# Patient Record
Sex: FEMALE | Race: BLACK OR AFRICAN AMERICAN | Employment: OTHER | URBAN - METROPOLITAN AREA
[De-identification: names, ages, dates, MRNs, and addresses within clinical notes are randomized per-mention and may not be internally consistent; named-entity substitution may affect disease eponyms.]

---

## 2017-03-07 ENCOUNTER — HOSPITAL ENCOUNTER (OUTPATIENT)
Dept: MAMMOGRAPHY | Age: 57
Discharge: HOME | End: 2017-03-07
Admitting: OTOLARYNGOLOGY
Payer: COMMERCIAL

## 2017-03-07 DIAGNOSIS — Z12.31 VISIT FOR SCREENING MAMMOGRAM: ICD-10-CM

## 2017-03-07 PROCEDURE — 77067 SCR MAMMO BI INCL CAD: CPT

## 2017-05-10 ENCOUNTER — HOSPITAL ENCOUNTER (OUTPATIENT)
Dept: PHYSICAL THERAPY | Age: 57
Discharge: HOME | End: 2017-05-10
Payer: COMMERCIAL

## 2017-05-10 PROCEDURE — 97140 MANUAL THERAPY 1/> REGIONS: CPT | Performed by: PHYSICAL THERAPIST

## 2017-05-10 PROCEDURE — 97161 PT EVAL LOW COMPLEX 20 MIN: CPT | Performed by: PHYSICAL THERAPIST

## 2017-05-10 PROCEDURE — 97110 THERAPEUTIC EXERCISES: CPT | Performed by: PHYSICAL THERAPIST
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
## 2017-05-17 ENCOUNTER — HOSPITAL ENCOUNTER (OUTPATIENT)
Dept: PHYSICAL THERAPY | Age: 57
Discharge: HOME | End: 2017-05-17
Payer: COMMERCIAL

## 2017-05-17 PROCEDURE — 97140 MANUAL THERAPY 1/> REGIONS: CPT | Performed by: PHYSICAL THERAPIST

## 2017-05-17 PROCEDURE — 97110 THERAPEUTIC EXERCISES: CPT | Performed by: PHYSICAL THERAPIST
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
## 2017-05-19 ENCOUNTER — HOSPITAL ENCOUNTER (OUTPATIENT)
Dept: PHYSICAL THERAPY | Age: 57
Discharge: HOME | End: 2017-05-19
Payer: COMMERCIAL

## 2017-05-19 PROCEDURE — 97140 MANUAL THERAPY 1/> REGIONS: CPT | Performed by: PHYSICAL THERAPIST

## 2017-05-19 PROCEDURE — 97110 THERAPEUTIC EXERCISES: CPT | Performed by: PHYSICAL THERAPIST
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
## 2017-05-23 ENCOUNTER — HOSPITAL ENCOUNTER (OUTPATIENT)
Dept: PHYSICAL THERAPY | Age: 57
Discharge: HOME | End: 2017-05-23
Payer: COMMERCIAL

## 2017-05-23 NOTE — PROGRESS NOTES
[facility]  [address], [address] [facility]    OUTPATIENT physical Therapy      5/23/2017:  Mayur Apple was not seen on this date for physical therapy for the following reasons:    [x]     Patient called to cancel the visit for the following reasons: transportation   []     Patient missed the visit and did not call to cancel.     Mekhi Eng, PT

## 2017-05-26 ENCOUNTER — HOSPITAL ENCOUNTER (OUTPATIENT)
Dept: PHYSICAL THERAPY | Age: 57
Discharge: HOME | End: 2017-05-26
Payer: COMMERCIAL

## 2017-05-26 PROCEDURE — 97110 THERAPEUTIC EXERCISES: CPT | Performed by: PHYSICAL THERAPIST

## 2017-05-26 PROCEDURE — 97014 ELECTRIC STIMULATION THERAPY: CPT | Performed by: PHYSICAL THERAPIST

## 2017-05-26 PROCEDURE — 97140 MANUAL THERAPY 1/> REGIONS: CPT | Performed by: PHYSICAL THERAPIST

## 2017-05-26 NOTE — PROGRESS NOTES
[facility]  [address], [address] [facility]    OUTPATIENT physical Therapy DAILY Treatment NOTe  Visit: 4    REASSESSMENT FOR BILATERAL KNEE PAIN    NAME: Wilner Burks AGE: 56 y.o. GENDER: female  DATE: 5/26/2017  REFERRING PHYSICIAN: Kelsey Todd MD ; Ekaterina Dexter MD      GOALS  Short term goals  Time frame: 3 weeks  1. Patient will be compliant and independent with the initial HEP as evidenced by being able to perform without cueing. MET  2. Patient will report a 25% improvement in symptoms. 3. Patient report a 25% improvement in sleeping. 4. Patient will have an increased tolerance for driving to allow 20 minutes of the activity before symptoms start. 5. Patient will tolerate 20 minutes of clinic activities before onset of symptoms.      Long term goals  Time frame: 6 weeks  1. Patient will report pain level decrease to 2/10 to allow increased ease of movement. 2. Patient will have an improved score on the NDI outcome measure by 7 points to demonstrate an increase in functional activity tolerance. 3. Patient will be independent in final individualized HEP. 4. Patient will have full, pain free AROM of cervical spine to allow ease with driving. 5. Patient will return to work without being limited by symptoms. 6. Patient will sleep 6 hours without being interrupted by pain  7. Patient will improve bilateral knee flexion AAROM by 5 degrees to improve gait. New referral for bilateral knee OA: 5/26/17  Bilateral primary osteoarthritis of knee (M17.0)    SUBJECTIVE:   \"Everything is sore today.  I got shots in my knees yesterday\"    Pain In: 6/10 in low back and neck; 10/10 in bilateral knees    OBJECTIVE DATA SUMMARY:     EXAMINATION/PRESENTATION/DECISION MAKING:   Pain:   Location:left sided neck pain; left sided low back pain (neck >back)  Quality: aching  Now: 6/10  Best: 5/10  Worst: 6/10  Factors that improve pain: heat, medication: Celebrex used and beneficial     Pain: added 5/26/17- new referral   Location: bilateral knees: Lt >Rt  Now: 10/10  Best: 7/10  Worst:10/10     Posture:    Forward head, rounded shoulders     Spinal Assessment:   Cervical Spine (AROM)  Flexion:  25% limited; stretch  Extension: 25% limited; no pain  R side bend: 25% limited; stretch  L side bend: 25% limited; no pain  R rotation: 25% limited; stretch  L rotation: 25% limited; no pain     Lumbar Spine (AROM)  (*Measured 3rd finger from the floor)  Flexion  12\"; no increase in pain  Extension 50% limited; pain  R side bend 19\"  L side bend 19\"; stretch  R rotation WNL; stretch  L rotation WNL     Joint Mobility:   Mild hypomobility noted in cervical spine  Hypomobile in patella glides B, sup/inf, med/lat     Range of Motion: added 5/26/17-new referral   Left knee:  Flexion: AROM: 96 degrees; AAROM: 102 degrees  Extension: 0 degrees    Right knee:   Flexion: AROM: 96 degrees; AAROM: 105 degrees  Extension: 0 degrees     Palpation:   Tender to palpation at left UT, scalenes, levator scapulae, rhomboids, and sub occipitals  Mild tenderness to erector spinae musculature in lower thoracic spine  Mild tenderness to palpation at bilateral knee joint and patella      Neurologic Assessment:  Tone: Normal  Sensation: Intact  Reflexes: NT     Special Tests:   (+) Spurlings  (-) Slump  (-) Lyssa's     Mobility:  Transitional Movements: Increased time to perform   Gait: Patient ambulates with straight cane due to chronic bilateral knee pain     Functional Measure:   NDI: 24/50     TREATMENT/INTERVENTION:  Modalities (Rationale): to decrease pain and muscle guarding  MHP and e stim to neck and low back in supine for for 15 minutes at end of session; no skin irritation noted  Cold pack to bilateral knees in supine with bolster under knees     Therapeutic Exercises:  HEP provided on evaluation:  LTR, SKTC, Sidelying lumbar rotation stretch, scalene stretch,   levator scap stretch, UT stretch, chin tucks, scapular retraction    Added to HEP 5/26/17: quad sets, SLR, heel slides, hip adduction with ball, hip abduction with TB;  Standing hip flexion, hip abduction, hamstring curls, and mini squats     Chin tucks: 10 reps with 5 second holds  Scapular retraction with green TB: 2 sets of 10 reps  Scalene stretch: 3 reps with 15 second holds  Levator scapulae stretch: 3 reps with 15 second holds  UT stretch (to right): 3 reps with 15 second holds  Lower cervical/upper thoracic stretch: 3 reps with 15 second holds  Biceps/chest stretch: 3 reps with 15 second holds  Corner stretch (pecs): 3 reps with 15 second holds      LTR: 10 reps  BKTC with yellow physioball: 10 reps   Sidelying lumbar rotation stretch (left): 2 reps with 15 second holds  Thoracolumbar stretch with arm overhead: 5 reps with 15 second holds  Seated lumbar rotation stretch: 3 reps with 10 second holds  Forward flexion on blue physioball: 10 reps     Quad sets with ball under knee: 10 reps with 5 second holds  Heel slides: 10 reps  SLR: 10 reps  Hip adduction with ball: 10 reps with 5 second holds   Hip abduction with green TB: 10 reps     Standing hip flexion: 10 reps  Standing hip abduction: 10 reps  Standing hamstring curls: 10 reps  Mini squats: 10 reps    Manual Therapy:  Manual cervical traction with suboccipital release in supine  STM to left quadratus lumborum  Trigger point release to UT and rhomboids   Instrument assisted soft tissue mobilization to left UT; patient educated on purpose and affect of intervention. Patient visualized petechiae and verbalized understanding.    Knee flexion stretch bilaterally in supine: 5 reps with 10 second holds    Neuro Re-Education:  Discussed use of lumbar roll in sitting for support    Activity tolerance and post treatment pain report:   Good  Pain Out: 6/10    Education:  Education was provided to the patient on the following topics  []    No changes were made to the home exercise program.  [x]    The following changes were made to the home exercise program: provided patient with strengthening exercises for bilateral knees (new referral)  Patient verbalized understanding of the topics presented. ASSESSMENT:   Patient presents with new referral for bilateral knee OA. Patient reports that she will most likely have total knee replacement in August or September 2017. Patient with AROM deficits in bilateral knees secondary to pain. Patient reports bilateral knee pain is worse than neck and back pain which she has been in therapy for. Patient presents with decrease in left sided neck and low back pain at quadratus lumborum since start of therapy. No headaches reported. Patient with good adherence to HEP; provided with additional exercises for bilateral knee strengthening. Patient educated on importance of proper adherence to HEP. Patient tolerated new exercises, manual therapy, and e stim trial well this date. Patients progression toward goals is as follows:  [x]     Improving appropriately and progressing toward goals  []     Improving slowly and progressing toward goals  []     Not making progress toward goals and plan of care will be adjusted    PLAN OF CARE:   Patient continues to benefit from skilled intervention to address the above impairments. [x]    Continue treatment per established plan of care.   []     Recommend the following changes to the plan of care:     Recommendations/Intent for next treatment: reassess recall of new HEP    Casey Gilmore, PT   Time Calculation: 60 mins  Patient Time in clinic:   Start Time: 4800   Stop Time: 1140    TREATMENT PLAN EFFECTIVE DATES:   5/26/2017 TO 7/10/17  I have read the above plan of care for Vinny Rosen and certify the need for skilled physical therapy services.     Physician Signature: ____________________________________________________     Date: _________________________________________________________________

## 2017-05-31 ENCOUNTER — HOSPITAL ENCOUNTER (OUTPATIENT)
Dept: PHYSICAL THERAPY | Age: 57
Discharge: HOME | End: 2017-05-31
Payer: COMMERCIAL

## 2017-05-31 PROCEDURE — 97140 MANUAL THERAPY 1/> REGIONS: CPT | Performed by: PHYSICAL THERAPIST

## 2017-05-31 PROCEDURE — 97014 ELECTRIC STIMULATION THERAPY: CPT | Performed by: PHYSICAL THERAPIST

## 2017-05-31 NOTE — PROGRESS NOTES
[facility]  [address], [address] [facility]    OUTPATIENT physical Therapy DAILY Treatment NOTe  Visit: 5      NAME: Adam Mullen AGE: 56 y.o. GENDER: female  DATE: 5/31/2017  REFERRING PHYSICIAN: Mason Roman MD ; Josh Briones MD      GOALS  Short term goals  Time frame: 3 weeks  1. Patient will be compliant and independent with the initial HEP as evidenced by being able to perform without cueing. MET  2. Patient will report a 25% improvement in symptoms. 3. Patient report a 25% improvement in sleeping. 4. Patient will have an increased tolerance for driving to allow 20 minutes of the activity before symptoms start. 5. Patient will tolerate 20 minutes of clinic activities before onset of symptoms.      Long term goals  Time frame: 6 weeks  1. Patient will report pain level decrease to 2/10 to allow increased ease of movement. 2. Patient will have an improved score on the NDI outcome measure by 7 points to demonstrate an increase in functional activity tolerance. 3. Patient will be independent in final individualized HEP. 4. Patient will have full, pain free AROM of cervical spine to allow ease with driving. 5. Patient will return to work without being limited by symptoms. 6. Patient will sleep 6 hours without being interrupted by pain  7. Patient will improve bilateral knee flexion AAROM by 5 degrees to improve gait. SUBJECTIVE:   \"I did the knee exercises at home. It felt good with the ice on my knees. \"    Pain In: 5/10 in low back and neck; 10/10 in bilateral knees    OBJECTIVE DATA SUMMARY:   Objective data from initial evaluation and new referral on 5/26/17:  EXAMINATION/PRESENTATION/DECISION MAKING:   Pain:   Location:left sided neck pain; left sided low back pain (neck >back)  Quality: aching  Now: 6/10  Best: 5/10  Worst: 6/10  Factors that improve pain: heat, medication: Celebrex used and beneficial     Pain: added 5/26/17- new referral   Location: bilateral knees: Lt >Rt  Now: 10/10  Best: 7/10  Worst:10/10     Posture:    Forward head, rounded shoulders     Spinal Assessment:   Cervical Spine (AROM)  Flexion:  25% limited; stretch  Extension: 25% limited; no pain  R side bend: 25% limited; stretch  L side bend: 25% limited; no pain  R rotation: 25% limited; stretch  L rotation: 25% limited; no pain     Lumbar Spine (AROM)  (*Measured 3rd finger from the floor)  Flexion  12\"; no increase in pain  Extension 50% limited; pain  R side bend 19\"  L side bend 19\"; stretch  R rotation WNL; stretch  L rotation WNL     Joint Mobility:   Mild hypomobility noted in cervical spine  Hypomobile in patella glides B, sup/inf, med/lat     Range of Motion: added 5/26/17-new referral   Left knee:  Flexion: AROM: 96 degrees; AAROM: 102 degrees  Extension: 0 degrees    Right knee:   Flexion: AROM: 96 degrees; AAROM: 105 degrees  Extension: 0 degrees     Palpation:   Tender to palpation at left UT, scalenes, levator scapulae, rhomboids, and sub occipitals  Mild tenderness to erector spinae musculature in lower thoracic spine  Mild tenderness to palpation at bilateral knee joint and patella      Neurologic Assessment:  Tone: Normal  Sensation: Intact  Reflexes: NT     Special Tests:   (+) Spurlings  (-) Slump  (-) Lyssa's     Mobility:  Transitional Movements: Increased time to perform   Gait: Patient ambulates with straight cane due to chronic bilateral knee pain     Functional Measure:   NDI: 24/50     TREATMENT/INTERVENTION:  Modalities (Rationale): to decrease pain and muscle guarding  MHP and e stim to neck and low back in supine for for 15 minutes at end of session; no skin irritation noted  Cold pack to bilateral knees in supine with bolster under knees     Therapeutic Exercises: Exercises held secondary to patient 15 minutes late to treatment   HEP provided on evaluation:  LTR, SKTC, Sidelying lumbar rotation stretch, scalene stretch,   levator scap stretch, UT stretch, chin tucks, scapular retraction    Added to HEP 5/26/17: quad sets, SLR, heel slides, hip adduction with ball, hip abduction with TB;  Standing hip flexion, hip abduction, hamstring curls, and mini squats     Chin tucks: 10 reps with 5 second holds  Scapular retraction with green TB: 2 sets of 10 reps  Scalene stretch: 3 reps with 15 second holds  Levator scapulae stretch: 3 reps with 15 second holds  UT stretch (to right): 3 reps with 15 second holds  Lower cervical/upper thoracic stretch: 3 reps with 15 second holds  Biceps/chest stretch: 3 reps with 15 second holds  Corner stretch (pecs): 3 reps with 15 second holds      LTR: 10 reps  BKTC with yellow physioball: 10 reps   Sidelying lumbar rotation stretch (left): 2 reps with 15 second holds  Thoracolumbar stretch with arm overhead: 5 reps with 15 second holds  Seated lumbar rotation stretch: 3 reps with 10 second holds  Forward flexion on blue physioball: 10 reps     Quad sets with ball under knee: 10 reps with 5 second holds  Heel slides: 10 reps  SLR: 10 reps  Hip adduction with ball: 10 reps with 5 second holds   Hip abduction with green TB: 10 reps     Standing hip flexion: 10 reps  Standing hip abduction: 10 reps  Standing hamstring curls: 10 reps  Mini squats: 10 reps    Manual Therapy:  Manual cervical traction with suboccipital release in supine  STM to left quadratus lumborum  Trigger point release to left UT and rhomboids   Instrument assisted soft tissue mobilization to left UT and rhomboids; patient educated on purpose and affect of intervention. Patient visualized petechiae and verbalized understanding.    Knee flexion stretch bilaterally in supine: 5 reps with 10 second holds    Neuro Re-Education:  Discussed use of lumbar roll in sitting for support    Activity tolerance and post treatment pain report:   Good  Pain Out: 6/10    Education:  Education was provided to the patient on the following topics  [x]    No changes were made to the home exercise program.  []    The following changes were made to the home exercise program  Patient verbalized understanding of the topics presented. ASSESSMENT:   Patient reports good adherence to new knee exercises provided last session. Patient reports that she will most likely have total knee replacement in August or September 2017. Patient with AROM deficits in bilateral knees secondary to pain. Patient reports chronic bilateral knee pain is worse than neck and back pain. Patient presents with decrease in left sided neck and low back pain at quadratus lumborum since start of therapy. No headaches reported. Patient with good adherence to HEP. Patient educated on importance of proper adherence to HEP. Patient 15 minutes late to session; exercises held this date but patient tolerated manual therapy and e stim well with pain relief noted. Patients progression toward goals is as follows:  [x]     Improving appropriately and progressing toward goals  []     Improving slowly and progressing toward goals  []     Not making progress toward goals and plan of care will be adjusted    PLAN OF CARE:   Patient continues to benefit from skilled intervention to address the above impairments. [x]    Continue treatment per established plan of care.   []     Recommend the following changes to the plan of care:     Recommendations/Intent for next treatment: reassess recall of new HEP and response to IASNADER Brown, PT   Time Calculation: 30 mins  Patient Time in clinic:   Start Time: 5069   Stop Time: 530 [id number]

## 2017-06-02 ENCOUNTER — HOSPITAL ENCOUNTER (OUTPATIENT)
Dept: PHYSICAL THERAPY | Age: 57
Discharge: HOME | End: 2017-06-02
Payer: COMMERCIAL

## 2017-06-02 PROCEDURE — 97110 THERAPEUTIC EXERCISES: CPT | Performed by: PHYSICAL THERAPIST

## 2017-06-02 PROCEDURE — 97014 ELECTRIC STIMULATION THERAPY: CPT | Performed by: PHYSICAL THERAPIST

## 2017-06-02 NOTE — PROGRESS NOTES
[facility]  [address], [address] [facility] ([place], [place])    OUTPATIENT physical Therapy DAILY Treatment NOTe  Visit: 6      NAME: Edmond Fang AGE: 56 y.o. GENDER: female  DATE: 6/2/2017  REFERRING PHYSICIAN: Alfonso Rob MD ; Adi Martinez MD      GOALS  Short term goals  Time frame: 3 weeks  1. Patient will be compliant and independent with the initial HEP as evidenced by being able to perform without cueing. MET  2. Patient will report a 25% improvement in symptoms. 3. Patient report a 25% improvement in sleeping. 4. Patient will have an increased tolerance for driving to allow 20 minutes of the activity before symptoms start. 5. Patient will tolerate 20 minutes of clinic activities before onset of symptoms.      Long term goals  Time frame: 6 weeks  1. Patient will report pain level decrease to 2/10 to allow increased ease of movement. 2. Patient will have an improved score on the NDI outcome measure by 7 points to demonstrate an increase in functional activity tolerance. 3. Patient will be independent in final individualized HEP. 4. Patient will have full, pain free AROM of cervical spine to allow ease with driving. 5. Patient will return to work without being limited by symptoms. 6. Patient will sleep 6 hours without being interrupted by pain  7. Patient will improve bilateral knee flexion AAROM by 5 degrees to improve gait. SUBJECTIVE:   \"I've been doing the exercises at home. \"    Pain In: 5/10 in low back and neck; 10/10 in bilateral knees    OBJECTIVE DATA SUMMARY:   Objective data from initial evaluation and new referral on 5/26/17:  EXAMINATION/PRESENTATION/DECISION MAKING:   Pain:   Location:left sided neck pain; left sided low back pain (neck >back)  Quality: aching  Now: 6/10  Best: 5/10  Worst: 6/10  Factors that improve pain: heat, medication: Celebrex used and beneficial     Pain: added 5/26/17- new referral   Location: bilateral knees: Lt >Rt  Now: 10/10  Best: 7/10  Worst:10/10     Posture:    Forward head, rounded shoulders     Spinal Assessment:   Cervical Spine (AROM)  Flexion:  25% limited; stretch  Extension: 25% limited; no pain  R side bend: 25% limited; stretch  L side bend: 25% limited; no pain  R rotation: 25% limited; stretch  L rotation: 25% limited; no pain     Lumbar Spine (AROM)  (*Measured 3rd finger from the floor)  Flexion  12\"; no increase in pain  Extension 50% limited; pain  R side bend 19\"  L side bend 19\"; stretch  R rotation WNL; stretch  L rotation WNL     Joint Mobility:   Mild hypomobility noted in cervical spine  Hypomobile in patella glides B, sup/inf, med/lat     Range of Motion: added 5/26/17-new referral   Left knee:  Flexion: AROM: 96 degrees; AAROM: 102 degrees  Extension: 0 degrees    Right knee:   Flexion: AROM: 96 degrees; AAROM: 105 degrees  Extension: 0 degrees     Palpation:   Tender to palpation at left UT, scalenes, levator scapulae, rhomboids, and sub occipitals  Mild tenderness to erector spinae musculature in lower thoracic spine  Mild tenderness to palpation at bilateral knee joint and patella      Neurologic Assessment:  Tone: Normal  Sensation: Intact  Reflexes: NT     Special Tests:   (+) Spurlings  (-) Slump  (-) Lyssa's     Mobility:  Transitional Movements: Increased time to perform   Gait: Patient ambulates with straight cane due to chronic bilateral knee pain     Functional Measure:   NDI: 24/50     TREATMENT/INTERVENTION:  Modalities (Rationale): to decrease pain and muscle guarding  MHP and e stim to neck and low back in supine for for 15 minutes at end of session; no skin irritation noted  Cold pack to bilateral knees in supine with bolster under knees     Therapeutic Exercises:   HEP provided on evaluation:  LTR, SKTC, Sidelying lumbar rotation stretch, scalene stretch,   levator scap stretch, UT stretch, chin tucks, scapular retraction    Added to HEP 5/26/17: quad sets, SLR, heel slides, hip adduction with ball, hip abduction with TB;  Standing hip flexion, hip abduction, hamstring curls, and mini squats     Chin tucks: 10 reps with 5 second holds  Scapular retraction with green TB: 2 sets of 10 reps  Scalene stretch: 3 reps with 15 second holds  Levator scapulae stretch: 3 reps with 15 second holds  UT stretch (to right): 3 reps with 15 second holds  Lower cervical/upper thoracic stretch: 3 reps with 15 second holds  Biceps/chest stretch: 3 reps with 15 second holds  Corner stretch (pecs): 3 reps with 15 second holds      LTR: 10 reps  BKTC with yellow physioball: 10 reps   Sidelying lumbar rotation stretch (left): 2 reps with 15 second holds  Thoracolumbar stretch with arm overhead: 5 reps with 15 second holds  Seated lumbar rotation stretch: 3 reps with 10 second holds  Forward flexion on blue physioball: 10 reps     Quad sets with ball under knee: 10 reps with 5 second holds  Heel slides: 10 reps  SLR: 10 reps  Hip adduction with ball: 10 reps with 5 second holds   Hip abduction with green TB: 10 reps     Standing hip flexion: 10 reps  Standing hip abduction: 10 reps  Standing hamstring curls: 10 reps  Mini squats: 10 reps    Manual Therapy:  Manual cervical traction with suboccipital release in supine  STM to left quadratus lumborum  Trigger point release to left UT and rhomboids   Instrument assisted soft tissue mobilization to left UT and rhomboids; patient educated on purpose and affect of intervention. Patient visualized petechiae and verbalized understanding.    Knee flexion stretch bilaterally in supine: 5 reps with 10 second holds    Neuro Re-Education:  Discussed use of lumbar roll in sitting for support    Activity tolerance and post treatment pain report:   Good  Pain Out: 6/10    Education:  Education was provided to the patient on the following topics  [x]    No changes were made to the home exercise program.  []    The following changes were made to the home exercise program  Patient verbalized understanding of the topics presented. ASSESSMENT:   Patient reports good adherence to HEP. Patient reports that she will most likely have total knee replacement in August or September 2017. Patient with AROM deficits in bilateral knees secondary to pain. Patient reports chronic bilateral knee pain is worse than neck and back pain. Patient presents with decrease in left sided neck and low back pain at quadratus lumborum since start of therapy. No headaches reported. Patients progression toward goals is as follows:  [x]     Improving appropriately and progressing toward goals  []     Improving slowly and progressing toward goals  []     Not making progress toward goals and plan of care will be adjusted    PLAN OF CARE:   Patient continues to benefit from skilled intervention to address the above impairments. [x]    Continue treatment per established plan of care.   []     Recommend the following changes to the plan of care:     Recommendations/Intent for next treatment: focus on manual therapy next session  Casey Gilmore PT   Time Calculation: 30 mins  Patient Time in clinic:   Start Time: 5010   Stop Time: 2082

## 2017-06-06 ENCOUNTER — HOSPITAL ENCOUNTER (OUTPATIENT)
Dept: PHYSICAL THERAPY | Age: 57
Discharge: HOME | End: 2017-06-06
Payer: COMMERCIAL

## 2017-06-06 PROCEDURE — 97014 ELECTRIC STIMULATION THERAPY: CPT | Performed by: PHYSICAL THERAPIST

## 2017-06-06 PROCEDURE — 97110 THERAPEUTIC EXERCISES: CPT | Performed by: PHYSICAL THERAPIST

## 2017-06-06 NOTE — PROGRESS NOTES
[facility]  [address], [address] [facility]    OUTPATIENT physical Therapy DAILY Treatment NOTe  Visit: 7    NAME: Dominique Sanchez AGE: 56 y.o. GENDER: female  DATE: 6/6/2017  REFERRING PHYSICIAN: Tania Rinne, MD ; Freedom Kiran MD      GOALS  Short term goals  Time frame: 3 weeks  1. Patient will be compliant and independent with the initial HEP as evidenced by being able to perform without cueing. MET  2. Patient will report a 25% improvement in symptoms. MET  3. Patient report a 25% improvement in sleeping. MET  4. Patient will have an increased tolerance for driving to allow 20 minutes of the activity before symptoms start. 5. Patient will tolerate 20 minutes of clinic activities before onset of symptoms. MET     Long term goals  Time frame: 6 weeks  1. Patient will report pain level decrease to 2/10 to allow increased ease of movement. 2. Patient will have an improved score on the NDI outcome measure by 7 points to demonstrate an increase in functional activity tolerance. 3. Patient will be independent in final individualized HEP. 4. Patient will have full, pain free AROM of cervical spine to allow ease with driving. 5. Patient will return to work without being limited by symptoms. 6. Patient will sleep 6 hours without being interrupted by pain  7. Patient will improve bilateral knee flexion AAROM by 5 degrees to improve gait. SUBJECTIVE:   \"I've been doing the exercises at home.  My neck has been feeling better\"    Pain In: 4/10 in low back and neck; 9/10 in bilateral knees    OBJECTIVE DATA SUMMARY:   Objective data from initial evaluation and new referral on 5/26/17:  EXAMINATION/PRESENTATION/DECISION MAKING:   Pain:   Location:left sided neck pain; left sided low back pain (neck >back)  Quality: aching  Now: 6/10  Best: 5/10  Worst: 6/10  Factors that improve pain: heat, medication: Celebrex used and beneficial     Pain: added 5/26/17- new referral Location: bilateral knees: Lt >Rt  Now: 10/10  Best: 7/10  Worst:10/10     Posture:    Forward head, rounded shoulders     Spinal Assessment:   Cervical Spine (AROM)  Flexion:  25% limited; stretch  Extension: 25% limited; no pain  R side bend: 25% limited; stretch  L side bend: 25% limited; no pain  R rotation: 25% limited; stretch  L rotation: 25% limited; no pain     Lumbar Spine (AROM)  (*Measured 3rd finger from the floor)  Flexion  12\"; no increase in pain  Extension 50% limited; pain  R side bend 19\"  L side bend 19\"; stretch  R rotation WNL; stretch  L rotation WNL     Joint Mobility:   Mild hypomobility noted in cervical spine  Hypomobile in patella glides B, sup/inf, med/lat     Range of Motion: added 5/26/17-new referral   Left knee:  Flexion: AROM: 96 degrees; AAROM: 102 degrees  Extension: 0 degrees    Right knee:   Flexion: AROM: 96 degrees; AAROM: 105 degrees  Extension: 0 degrees     Palpation:   Tender to palpation at left UT, scalenes, levator scapulae, rhomboids, and sub occipitals  Mild tenderness to erector spinae musculature in lower thoracic spine  Mild tenderness to palpation at bilateral knee joint and patella      Neurologic Assessment:  Tone: Normal  Sensation: Intact  Reflexes: NT     Special Tests:   (+) Spurlings  (-) Slump  (-) Lyssa's     Mobility:  Transitional Movements: Increased time to perform   Gait: Patient ambulates with straight cane due to chronic bilateral knee pain     Functional Measure:   NDI: 24/50     TREATMENT/INTERVENTION:  Modalities (Rationale): to decrease pain and muscle guarding  MHP and e stim to neck and low back in supine for for 15 minutes at start of session; no skin irritation noted  Cold pack to bilateral knees in supine with bolster under knees     Therapeutic Exercises:   HEP provided on evaluation:  LTR, SKTC, Sidelying lumbar rotation stretch, scalene stretch,   levator scap stretch, UT stretch, chin tucks, scapular retraction    Added to HEP 5/26/17: quad sets, SLR, heel slides, hip adduction with ball, hip abduction with TB;  Standing hip flexion, hip abduction, hamstring curls, and mini squats     Chin tucks: 10 reps with 5 second holds  Scapular retraction with green TB: 2 sets of 10 reps  Scalene stretch: 3 reps with 15 second holds  Levator scapulae stretch: 3 reps with 15 second holds  UT stretch (to right): 3 reps with 15 second holds  Lower cervical/upper thoracic stretch: 3 reps with 15 second holds  Biceps/chest stretch: 3 reps with 15 second holds  Corner stretch (pecs): 3 reps with 15 second holds      LTR: 10 reps  BKTC with yellow physioball: 10 reps   Sidelying lumbar rotation stretch (left): 2 reps with 15 second holds  Thoracolumbar stretch with arm overhead: 5 reps with 15 second holds  Seated lumbar rotation stretch: 3 reps with 10 second holds  Forward flexion on blue physioball: 10 reps     Quad sets with ball under knee: 10 reps with 5 second holds  Heel slides: 10 reps  SLR: 10 reps  Hip adduction with ball: 10 reps with 5 second holds   Hip abduction with green TB: 10 reps     Standing hip flexion: 10 reps  Standing hip abduction: 10 reps  Standing hamstring curls: 10 reps  Mini squats: 10 reps    Manual Therapy:  Manual cervical traction with suboccipital release in supine  STM to left quadratus lumborum  Trigger point release to left UT and rhomboids   Instrument assisted soft tissue mobilization to left UT and rhomboids; patient educated on purpose and affect of intervention. Patient visualized petechiae and verbalized understanding.    Knee flexion stretch bilaterally in supine: 5 reps with 10 second holds    Neuro Re-Education:  Discussed use of lumbar roll in sitting for support    Activity tolerance and post treatment pain report:   Good  Pain Out: 4/10    Education:  Education was provided to the patient on the following topics  [x]    No changes were made to the home exercise program.  []    The following changes were made to the home exercise program  Patient verbalized understanding of the topics presented. ASSESSMENT:   Patient reports good adherence to HEP. Patient reports that she will most likely have total knee replacement in August or September 2017. Patient with AROM deficits in bilateral knees secondary to pain. Patient reports chronic bilateral knee pain is worse than neck and back pain. Patient presents with decrease in left sided neck and low back pain at quadratus lumborum since start of therapy. No headaches reported. Patient tolerated exercises and e stim well this date. Focus to be place on manual therapy next session, particularly at rhomboids. Patients progression toward goals is as follows:  [x]     Improving appropriately and progressing toward goals  []     Improving slowly and progressing toward goals  []     Not making progress toward goals and plan of care will be adjusted    PLAN OF CARE:   Patient continues to benefit from skilled intervention to address the above impairments. [x]    Continue treatment per established plan of care.   []     Recommend the following changes to the plan of care:     Recommendations/Intent for next treatment: focus on manual therapy next session  Scotty Kent, PT   Time Calculation: 30 mins  Patient Time in clinic:   Start Time: 36   Stop Time: 56

## 2017-06-08 ENCOUNTER — HOSPITAL ENCOUNTER (OUTPATIENT)
Dept: PHYSICAL THERAPY | Age: 57
Discharge: HOME | End: 2017-06-08
Payer: COMMERCIAL

## 2017-06-08 PROCEDURE — 97140 MANUAL THERAPY 1/> REGIONS: CPT | Performed by: PHYSICAL THERAPIST

## 2017-06-08 PROCEDURE — 97014 ELECTRIC STIMULATION THERAPY: CPT | Performed by: PHYSICAL THERAPIST

## 2017-06-08 PROCEDURE — 97110 THERAPEUTIC EXERCISES: CPT | Performed by: PHYSICAL THERAPIST

## 2017-06-08 NOTE — PROGRESS NOTES
[facility]  [address], [address] [facility]    OUTPATIENT physical Therapy DAILY Treatment NOTe  Visit: 8      NAME: Heena Ferrara AGE: 56 y.o. GENDER: female  DATE: 6/8/2017  REFERRING PHYSICIAN: Irwin Pineda MD ; Ara Lambert MD      GOALS  Short term goals  Time frame: 3 weeks  1. Patient will be compliant and independent with the initial HEP as evidenced by being able to perform without cueing. MET  2. Patient will report a 25% improvement in symptoms. MET  3. Patient report a 25% improvement in sleeping. MET  4. Patient will have an increased tolerance for driving to allow 20 minutes of the activity before symptoms start. MET  5. Patient will tolerate 20 minutes of clinic activities before onset of symptoms. MET     Long term goals  Time frame: 6 weeks  1. Patient will report pain level decrease to 2/10 to allow increased ease of movement. 2. Patient will have an improved score on the NDI outcome measure by 7 points to demonstrate an increase in functional activity tolerance. 3. Patient will be independent in final individualized HEP. 4. Patient will have full, pain free AROM of cervical spine to allow ease with driving. 5. Patient will return to work without being limited by symptoms. 6. Patient will sleep 6 hours without being interrupted by pain  7. Patient will improve bilateral knee flexion AAROM by 5 degrees to improve gait.         SUBJECTIVE:   \"My low back isn't too bad, it's more sore in the upper middle part of my back\"    Pain In: 4/10 in mid back and neck; 9/10 in bilateral knees    OBJECTIVE DATA SUMMARY:   Objective data from initial evaluation and new referral on 5/26/17:  EXAMINATION/PRESENTATION/DECISION MAKING:   Pain:   Location:left sided neck pain; left sided low back pain (neck >back)  Quality: aching  Now: 6/10  Best: 5/10  Worst: 6/10  Factors that improve pain: heat, medication: Celebrex used and beneficial     Pain: added 5/26/17- new referral   Location: bilateral knees: Lt >Rt  Now: 10/10  Best: 7/10  Worst:10/10     Posture:    Forward head, rounded shoulders     Spinal Assessment:   Cervical Spine (AROM)  Flexion:  25% limited; stretch  Extension: 25% limited; no pain  R side bend: 25% limited; stretch  L side bend: 25% limited; no pain  R rotation: 25% limited; stretch  L rotation: 25% limited; no pain     Lumbar Spine (AROM)  (*Measured 3rd finger from the floor)  Flexion  12\"; no increase in pain  Extension 50% limited; pain  R side bend 19\"  L side bend 19\"; stretch  R rotation WNL; stretch  L rotation WNL     Joint Mobility:   Mild hypomobility noted in cervical spine  Hypomobile in patella glides B, sup/inf, med/lat     Range of Motion: added 5/26/17-new referral   Left knee:  Flexion: AROM: 96 degrees; AAROM: 102 degrees  Extension: 0 degrees    Right knee:   Flexion: AROM: 96 degrees; AAROM: 105 degrees  Extension: 0 degrees     Palpation:   Tender to palpation at left UT, scalenes, levator scapulae, rhomboids, and sub occipitals  Mild tenderness to erector spinae musculature in lower thoracic spine  Mild tenderness to palpation at bilateral knee joint and patella      Neurologic Assessment:  Tone: Normal  Sensation: Intact  Reflexes: NT     Special Tests:   (+) Spurlings  (-) Slump  (-) Lyssa's     Mobility:  Transitional Movements: Increased time to perform   Gait: Patient ambulates with straight cane due to chronic bilateral knee pain     Functional Measure:   NDI: 24/50     TREATMENT/INTERVENTION:  Modalities (Rationale): to decrease pain and muscle guarding  MHP and e stim to neck and low back in supine for for 15 minutes at start of session; no skin irritation noted  Cold pack to bilateral knees in supine with bolster under knees     Therapeutic Exercises:   HEP provided on evaluation:  LTR, SKTC, Sidelying lumbar rotation stretch, scalene stretch,   levator scap stretch, UT stretch, chin tucks, scapular retraction    Added to HEP 5/26/17: quad sets, SLR, heel slides, hip adduction with ball, hip abduction with TB;  Standing hip flexion, hip abduction, hamstring curls, and mini squats     Chin tucks: 10 reps with 5 second holds  Scapular retraction with green TB: 2 sets of 10 reps  Scalene stretch: 3 reps with 15 second holds  Levator scapulae stretch: 3 reps with 15 second holds  UT stretch (to right): 3 reps with 15 second holds  Lower cervical/upper thoracic stretch: 3 reps with 15 second holds  Biceps/chest stretch: 3 reps with 15 second holds  Corner stretch (pecs): 4 reps with 15 second holds   Shoulder shrugs: 10 reps     LTR: 10 reps  BKTC with yellow physioball: 10 reps   Sidelying lumbar rotation stretch (left): 2 reps with 15 second holds  Thoracolumbar stretch with arm overhead: 5 reps with 15 second holds  Seated lumbar rotation stretch: 3 reps with 10 second holds  Forward flexion on blue physioball: 10 reps     Quad sets with ball under knee: 10 reps with 5 second holds  Heel slides: 10 reps  SLR: 10 reps  Hip adduction with ball: 10 reps with 5 second holds   Hip abduction with green TB: 10 reps     Standing hip flexion: 10 reps  Standing hip abduction: 10 reps  Standing hamstring curls: 10 reps  Mini squats: 10 reps    Manual Therapy: 30 minutes  Manual cervical traction with suboccipital release in supine  STM to left quadratus lumborum  Trigger point release to left UT and rhomboids   Instrument assisted soft tissue mobilization to left UT and rhomboids; patient educated on purpose and affect of intervention. Patient visualized affect post intervention and verbalized understanding.    Knee flexion stretch bilaterally in supine: 5 reps with 10 second holds    Neuro Re-Education:  Discussed use of lumbar roll in sitting for support    Activity tolerance and post treatment pain report:   Good  Pain Out: 4/10    Education:  Education was provided to the patient on the following topics  [x]    No changes were made to the home exercise program.  []    The following changes were made to the home exercise program  Patient verbalized understanding of the topics presented. ASSESSMENT:   Patient presents with decreased left sided neck pain; reports most of pain is present in mid thoracic region at left rhomboids. Focused majority of session on manual therapy via instrument assisted soft tissue mobilization. Patient tolerated IASTM well, and visualized affect post intervention with good understanding. Patient reports good adherence to HEP; educated to continue stretches and exercises at home. No headaches reported. Patient reports mild low back pain. Patient reports that she will most likely have total knee replacement in August or September 2017. Patient with AROM deficits in bilateral knees secondary to pain. Patient reports chronic bilateral knee pain is worse than neck and back pain. Patient with good performance of HEP for bilateral knee strengthening. Patient tolerated exercises and e stim well this date. Patients progression toward goals is as follows:  [x]     Improving appropriately and progressing toward goals  []     Improving slowly and progressing toward goals  []     Not making progress toward goals and plan of care will be adjusted    PLAN OF CARE:   Patient continues to benefit from skilled intervention to address the above impairments. [x]    Continue treatment per established plan of care.   []     Recommend the following changes to the plan of care:     Recommendations/Intent for next treatment: reassess response to IASTM  Zaora Shirts, PT   Time Calculation: 70 mins  Patient Time in clinic:   Start Time: 0830   Stop Time: 3479

## 2017-06-15 ENCOUNTER — HOSPITAL ENCOUNTER (OUTPATIENT)
Dept: PHYSICAL THERAPY | Age: 57
Discharge: HOME | End: 2017-06-15
Payer: COMMERCIAL

## 2017-06-15 NOTE — PROGRESS NOTES
[facility]  [address], [address] [facility]    OUTPATIENT physical Therapy      6/15/2017:  Lyle Schilling was not seen on this date for physical therapy for the following reasons:    [x]     Patient called to cancel the visit for the following reasons: anxiety  []     Patient missed the visit and did not call to cancel.     Annmarie Kulkarni, PT

## 2017-07-03 ENCOUNTER — HOSPITAL ENCOUNTER (OUTPATIENT)
Dept: PHYSICAL THERAPY | Age: 57
Discharge: HOME | End: 2017-07-03
Payer: COMMERCIAL

## 2017-07-03 ENCOUNTER — APPOINTMENT (OUTPATIENT)
Dept: PHYSICAL THERAPY | Age: 57
End: 2017-07-03
Payer: COMMERCIAL

## 2017-07-03 PROCEDURE — 97014 ELECTRIC STIMULATION THERAPY: CPT | Performed by: PHYSICAL THERAPIST

## 2017-07-03 PROCEDURE — 97110 THERAPEUTIC EXERCISES: CPT | Performed by: PHYSICAL THERAPIST
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
## 2017-07-05 ENCOUNTER — HOSPITAL ENCOUNTER (OUTPATIENT)
Dept: PHYSICAL THERAPY | Age: 57
Discharge: HOME | End: 2017-07-05
Payer: COMMERCIAL

## 2017-07-05 NOTE — PROGRESS NOTES
[facility]  [address], [address] [facility]    OUTPATIENT physical Therapy      7/5/2017:  Shruthi Duffy was not seen on this date for physical therapy for the following reasons:    [x]     Patient called to cancel the visit for the following reasons: running late  []     Patient missed the visit and did not call to cancel.     Rebekah Grace, PT

## 2017-07-10 ENCOUNTER — HOSPITAL ENCOUNTER (OUTPATIENT)
Dept: PHYSICAL THERAPY | Age: 57
Discharge: HOME | End: 2017-07-10
Payer: COMMERCIAL

## 2017-07-10 PROCEDURE — 97014 ELECTRIC STIMULATION THERAPY: CPT | Performed by: PHYSICAL THERAPIST

## 2017-07-10 PROCEDURE — 97140 MANUAL THERAPY 1/> REGIONS: CPT | Performed by: PHYSICAL THERAPIST

## 2017-07-10 PROCEDURE — 97110 THERAPEUTIC EXERCISES: CPT | Performed by: PHYSICAL THERAPIST
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
## 2017-07-25 ENCOUNTER — HOSPITAL ENCOUNTER (OUTPATIENT)
Dept: PHYSICAL THERAPY | Age: 57
Discharge: HOME | End: 2017-07-25
Payer: COMMERCIAL

## 2017-07-25 PROCEDURE — 97014 ELECTRIC STIMULATION THERAPY: CPT | Performed by: PHYSICAL THERAPIST

## 2017-07-25 PROCEDURE — 97140 MANUAL THERAPY 1/> REGIONS: CPT | Performed by: PHYSICAL THERAPIST

## 2017-07-25 PROCEDURE — 97110 THERAPEUTIC EXERCISES: CPT | Performed by: PHYSICAL THERAPIST

## 2017-07-25 NOTE — PROGRESS NOTES
[facility]  [address], [address] [facility]    OUTPATIENT physical Therapy DAILY Treatment NOTe  Visit: 12      NAME: Ruy Javier AGE: 56 y.o. GENDER: female  DATE: 7/25/2017  REFERRING PHYSICIAN: Roula Sanchez MD ; Giles Gaxiola MD      GOALS  Short term goals  Time frame: 3 weeks  1. Patient will be compliant and independent with the initial HEP as evidenced by being able to perform without cueing. MET  2. Patient will report a 25% improvement in symptoms. MET  3. Patient report a 25% improvement in sleeping. MET  4. Patient will have an increased tolerance for driving to allow 20 minutes of the activity before symptoms start. MET  5. Patient will tolerate 20 minutes of clinic activities before onset of symptoms. MET     Long term goals  Time frame: 6 weeks  1. Patient will report pain level decrease to 2/10 to allow increased ease of movement. 2. Patient will have an improved score on the NDI outcome measure by 7 points to demonstrate an increase in functional activity tolerance. 3. Patient will be independent in final individualized HEP. 4. Patient will have full, pain free AROM of cervical spine to allow ease with driving. 5. Patient will return to work without being limited by symptoms. 6. Patient will sleep 6 hours without being interrupted by pain  7. Patient will improve bilateral knee flexion AAROM by 5 degrees to improve gait. SUBJECTIVE:   \"It's feeling betetr. \"    Pain In: 3/10 in mid/low back and neck; 9/10 in bilateral knees    OBJECTIVE DATA SUMMARY:   Objective data from previous progress note:  EXAMINATION/PRESENTATION/DECISION MAKING:   Pain:   Location:left sided neck pain; left sided low back pain (neck >back)  Quality: aching  Now: 4/10  Best: 4/10  Worst: 6/10  Factors that improve pain: heat, medication: Celebrex used and beneficial; exercises    Pain: added 5/26/17- new referral   Location: bilateral knees: Lt >Rt  Now: 9/10  Best: 7/10  Worst:10/10     Posture:    Forward head, rounded shoulders     Spinal Assessment:   Cervical Spine (AROM)  Flexion:      WNL stretch  Extension:  25% limited; no pain  R side bend:  WNL; stretch  L side bend:  WNL; no pain  R rotation:  WNL; stretch  L rotation:  WNL; no pain     Lumbar Spine (AROM)  (*Measured 3rd finger from the floor)  Flexion  12\"; no increase in pain  Extension 25% limited; stretch  R side bend 19\"  L side bend 19\"; stretch  R rotation WNL; stretch  L rotation WNL     Joint Mobility:   Mild hypomobility noted in cervical spine  Hypomobile in patella glides B, sup/inf, med/lat     Range of Motion: added 5/26/17-new referral   Left knee:  Flexion: AROM: 96 degrees; AAROM: 102 degrees  Extension: 0 degrees    Right knee:   Flexion: AROM: 96 degrees; AAROM: 105 degrees  Extension: 0 degrees     Palpation:   Mild tenderness to palpation at left UT, scalenes, levator scapulae, rhomboids, and sub occipitals  Tenderness to erector spinae musculature in lower thoracic spine  Mild tenderness to palpation at bilateral knee joint and patella      Special Tests:   (-) Spurlings  (-) Slump  (-) Lyssa's     Mobility:  Transitional Movements: Increased time to perform   Gait: Patient ambulates with straight cane due to chronic bilateral knee pain     Functional Measure:   NDI: 24/50 on evaluation  NDI: 7/50 on 7/10/17     TREATMENT/INTERVENTION:  Modalities (Rationale): to decrease pain and muscle guarding  MHP and e stim to UT and low back in supine for 15 minutes at end of session; no skin irritation noted  Cold pack to bilateral knees in supine with bolster under knees     Therapeutic Exercises:   HEP provided on evaluation:  LTR, SKTC, Sidelying lumbar rotation stretch, scalene stretch,   levator scap stretch, UT stretch, chin tucks, scapular retraction    Added to HEP 5/26/17: quad sets, SLR, heel slides, hip adduction with ball, hip abduction with TB;  Standing hip flexion, hip abduction, hamstring curls, and mini squats     Chin tucks: 10 reps with 5 second holds  Scalene stretch: 3 reps with 15 second holds  Levator scapulae stretch: 3 reps with 15 second holds  UT stretch (to right): 3 reps with 15 second holds  Lower cervical/upper thoracic stretch: 3 reps with 15 second holds  Biceps/chest stretch: 3 reps with 15 second holds  Corner stretch (pecs): 4 reps with 30 second holds   Shoulder shrugs: 10 reps  Pull downs at quantum 10#: 20 reps  Rows at quantum 10#: 20 reps     LTR on yellow physioball: 10 reps  BKTC with yellow physioball: 10 reps   Sidelying lumbar rotation stretch (left): 2 reps with 15 second holds  Thoracolumbar stretch with arm overhead: 5 reps with 10 second holds  Seated lumbar rotation stretch: 3 reps with 10 second holds  Forward flexion on blue physioball: 15 reps  Segmental flexion/extension over chair: 10 reps with 5 second holds     Quad sets with ball under knee: 10 reps with 5 second holds  Heel slides: 10 reps  SLR: 10 reps  Hip adduction with ball: 15 reps with 5 second holds   Hip abduction with green TB: 10 reps      Standing hip flexion: 10 reps  Standing hip abduction: 10 reps  Standing hamstring curls: 10 reps  Mini squats: 10 reps    Manual Therapy:   Manual cervical traction with suboccipital release in supine  STM to quadratus lumborum and erector spinae musculature in sidelying  Trigger point release to UT and rhomboids   Instrument assisted soft tissue mobilization to left UT and rhomboids; patient educated on purpose and affect of intervention. Patient visualized affect post intervention and verbalized understanding. Knee flexion stretch bilaterally in supine: 5 reps with 10 second holds    Neuro Re-Education:  Discussed use of lumbar roll in sitting for support  Rock tape to bilateral knees for increased support, pain relief, and proprioceptive cueing. Educated on removal of tape with good understanding verbalized.      Activity tolerance and post treatment pain report:   Good  Pain Out: 3/10    Education:  Education was provided to the patient on the following topics   [x]    No changes were made to the home exercise program.  []    The following changes were made to the home exercise program  Patient verbalized understanding of the topics presented. ASSESSMENT:   Patient presents with decreased neck and low back pain. Patient reports good adherence to HEP; educated to continue stretches and exercises at home. Good response to e stim. Patient had good response to Rock Tape to bilateral knees last session; apply next session. Encouraged patient to continue walking program and possibly join gym if able. Patients progression toward goals is as follows:  [x]     Improving appropriately and progressing toward goals  []     Improving slowly and progressing toward goals  []     Not making progress toward goals and plan of care will be adjusted    PLAN OF CARE:   Patient continues to benefit from skilled intervention to address the above impairments. [x]    Continue treatment per established plan of care.   []     Recommend the following changes to the plan of care:     Recommendations/Intent for next treatment: manual therapy    Angel George PT   Time Calculation: 60 mins  Patient Time in clinic:   Start Time: 1330   Stop Time: 1430

## 2017-08-08 ENCOUNTER — HOSPITAL ENCOUNTER (OUTPATIENT)
Dept: PHYSICAL THERAPY | Age: 57
Discharge: HOME | End: 2017-08-08
Payer: COMMERCIAL

## 2017-08-08 PROCEDURE — 97110 THERAPEUTIC EXERCISES: CPT | Performed by: PHYSICAL THERAPIST

## 2017-08-08 NOTE — PROGRESS NOTES
[facility]  [address], [address] [facility]    OUTPATIENT physical Therapy DAILY Treatment NOTe with discharge  Visit: 13      NAME: Karan Gomez AGE: 56 y.o. GENDER: female  DATE: 8/8/2017  REFERRING PHYSICIAN: Alyse Murillo MD ; Shauna Vaughn MD      GOALS  Short term goals  Time frame: 3 weeks  1. Patient will be compliant and independent with the initial HEP as evidenced by being able to perform without cueing. MET  2. Patient will report a 25% improvement in symptoms. MET  3. Patient report a 25% improvement in sleeping. MET  4. Patient will have an increased tolerance for driving to allow 20 minutes of the activity before symptoms start. MET  5. Patient will tolerate 20 minutes of clinic activities before onset of symptoms. MET     Long term goals  Time frame: 6 weeks  1. Patient will report pain level decrease to 2/10 to allow increased ease of movement. MET  2. Patient will have an improved score on the NDI outcome measure by 7 points to demonstrate an increase in functional activity tolerance. MET  3. Patient will be independent in final individualized HEP. MET  4. Patient will have full, pain free AROM of cervical spine to allow ease with driving. MET  5. Patient will return to work without being limited by symptoms. MET  6. Patient will sleep 6 hours without being interrupted by pain. MET  7. Patient will improve bilateral knee flexion AAROM by 5 degrees to improve gait. ME       SUBJECTIVE:   \"I'm feeling good. Much better than when I started. I'll keep working on the knee exercises. \"    Pain In: 2/10 in mid/low back and neck; 8/10 in bilateral knees    OBJECTIVE DATA SUMMARY:     EXAMINATION/PRESENTATION/DECISION MAKING:   Pain:   Location:left sided neck pain; left sided low back pain (neck >back)  Quality: aching  Now: 2/10  Best: 2/10  Worst: 2/10  Factors that improve pain: heat, medication: Celebrex used and beneficial; exercises    Pain: added 5/26/17- new referral   Location: bilateral knees: Lt >Rt  Now: 8/10  Best: 7/10  Worst:10/10     Posture: Forward head, rounded shoulders     Spinal Assessment:   Cervical Spine (AROM)  Flexion:      WNL   Extension: WNL  R side bend:  WNL  L side bend: WNL  R rotation:  WNL  L rotation: WNL     Lumbar Spine (AROM)  (*Measured 3rd finger from the floor)  Flexion    12\"  Extension  WNL  R side bend  17\"  L side bend  17\"  R rotation  WNL  L rotation  WNL     Joint Mobility:   WNL c/s, l/s  Hypomobile in patella glides B, sup/inf, med/lat     Range of Motion: added 5/26/17-new referral   Left knee:  Flexion: AROM: 100 degrees; AAROM: 102 degrees  Extension: 0 degrees    Right knee:   Flexion: AROM: 100 degrees; AAROM: 105 degrees  Extension: 0 degrees     Palpation:   Mild tenderness to palpation at UT, levator scapulae, rhomboids, and suboccipitals  Mild tenderness to palpation at bilateral knee joint and patella      Special Tests:   (-) Spurlings  (-) Slump  (-) Lyssa's     Mobility:  Transitional Movements: WNL  Gait: WNL; no A. D.     Functional Measure:   NDI: 24/50 on evaluation  NDI: 7/50 on 7/10/17  NDI: 5/50 on discharge 8/8/17     TREATMENT/INTERVENTION:  Modalities (Rationale): to decrease pain and muscle guarding  MHP to neck in supine for 15 minutes at end of session; no skin irritation noted  Cold pack to bilateral knees in supine with bolster under knees     Therapeutic Exercises:   HEP provided on evaluation:  LTR, SKTC, Sidelying lumbar rotation stretch, scalene stretch,   levator scap stretch, UT stretch, chin tucks, scapular retraction    Added to HEP 5/26/17: quad sets, SLR, heel slides, hip adduction with ball, hip abduction with TB;  Standing hip flexion, hip abduction, hamstring curls, and mini squats     Chin tucks: 10 reps with 5 second holds  Scalene stretch: 3 reps with 15 second holds  Levator scapulae stretch: 3 reps with 15 second holds  UT stretch (to right): 3 reps with 15 second holds  Lower cervical/upper thoracic stretch: 3 reps with 15 second holds  Biceps/chest stretch: 3 reps with 15 second holds  Corner stretch (pecs): 4 reps with 30 second holds   Shoulder shrugs: 10 reps  Pull downs at quantum 10#: 20 reps  Rows at quantum 10#: 20 reps     LTR on yellow physioball: 10 reps  BKTC with yellow physioball: 10 reps   Sidelying lumbar rotation stretch (left): 2 reps with 15 second holds  Thoracolumbar stretch with arm overhead: 5 reps with 10 second holds  Seated lumbar rotation stretch: 3 reps with 10 second holds  Forward flexion on blue physioball: 15 reps  Segmental flexion/extension over chair: 10 reps with 5 second holds     Quad sets with ball under knee: 10 reps with 5 second holds  Heel slides: 10 reps  SLR: 10 reps  Hip adduction with ball: 15 reps with 5 second holds   Hip abduction with green TB: 10 reps      Standing hip flexion: 10 reps  Standing hip abduction: 10 reps  Standing hamstring curls: 10 reps  Mini squats: 10 reps    Manual Therapy:   Manual cervical traction with suboccipital release in supine  STM to quadratus lumborum and erector spinae musculature in sidelying  Trigger point release to UT and rhomboids   Instrument assisted soft tissue mobilization to left UT and rhomboids; patient educated on purpose and affect of intervention. Patient visualized affect post intervention and verbalized understanding. Knee flexion stretch bilaterally in supine: 5 reps with 10 second holds    Neuro Re-Education:  Discussed use of lumbar roll in sitting for support    Activity tolerance and post treatment pain report:   Good  Pain Out: 2/10    Education:  Education was provided to the patient on the following topics   []    No changes were made to the home exercise program.  [x]    The following changes were made to the home exercise program: Patient reports continued adherence to HEP  Patient verbalized understanding of the topics presented.     ASSESSMENT:   Patient discharged from physical therapy this date secondary to overall decrease in neck and back pain as well as independence with knee exercises. Patient with good adherence to HEP. Encouraged patient to continue walking program and possibly join gym if able. Patient with improvement in NDI outcome measure from 24/50 on evaluation to 5/50 on discharge. PLAN OF CARE:   Patient will be discharged from physical therapy at this time.   Criteria for termination of care:  [x]   Goals Achieved  []   [address]  []   Patient has not returned  []   Other  Plan for follow up, continuing care, or equipment recommendations: Patient educated to continue HEP and walking program  Thank you for this referral.    Ronak Vera, PT   Time Calculation: 25 mins  Patient Time in clinic:   Start Time: 8116   Stop Time: [phone]

## 2022-01-25 ENCOUNTER — APPOINTMENT (OUTPATIENT)
Dept: CT IMAGING | Age: 62
End: 2022-01-25
Attending: PHYSICIAN ASSISTANT
Payer: MEDICARE

## 2022-01-25 ENCOUNTER — HOSPITAL ENCOUNTER (EMERGENCY)
Age: 62
Discharge: HOME | End: 2022-01-25
Attending: EMERGENCY MEDICINE | Admitting: EMERGENCY MEDICINE
Payer: MEDICARE

## 2022-01-25 ENCOUNTER — APPOINTMENT (OUTPATIENT)
Dept: GENERAL RADIOLOGY | Age: 62
End: 2022-01-25
Attending: PHYSICIAN ASSISTANT
Payer: MEDICARE

## 2022-01-25 VITALS
DIASTOLIC BLOOD PRESSURE: 66 MMHG | RESPIRATION RATE: 20 BRPM | OXYGEN SATURATION: 97 % | TEMPERATURE: 98.8 F | HEART RATE: 57 BPM | SYSTOLIC BLOOD PRESSURE: 113 MMHG

## 2022-01-25 DIAGNOSIS — R51.9 ACUTE NONINTRACTABLE HEADACHE, UNSPECIFIED HEADACHE TYPE: ICD-10-CM

## 2022-01-25 DIAGNOSIS — M54.50 ACUTE MIDLINE LOW BACK PAIN WITHOUT SCIATICA: ICD-10-CM
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
## 2022-01-25 PROCEDURE — 71046 X-RAY EXAM CHEST 2 VIEWS: CPT

## 2022-01-25 PROCEDURE — 72050 X-RAY EXAM NECK SPINE 4/5VWS: CPT

## 2022-01-25 PROCEDURE — 74011250636 HC RX REV CODE- 250/636: Performed by: PHYSICIAN ASSISTANT

## 2022-01-25 PROCEDURE — 99284 EMERGENCY DEPT VISIT MOD MDM: CPT

## 2022-01-25 PROCEDURE — 72100 X-RAY EXAM L-S SPINE 2/3 VWS: CPT

## 2022-01-25 PROCEDURE — 96372 THER/PROPH/DIAG INJ SC/IM: CPT

## 2022-01-25 PROCEDURE — 72072 X-RAY EXAM THORAC SPINE 3VWS: CPT

## 2022-01-25 PROCEDURE — 70450 CT HEAD/BRAIN W/O DYE: CPT

## 2022-01-25 PROCEDURE — 74011250637 HC RX REV CODE- 250/637: Performed by: PHYSICIAN ASSISTANT

## 2022-01-25 RX ORDER — DICLOFENAC SODIUM 50 MG/1
50 TABLET, DELAYED RELEASE ORAL
Qty: 20 TABLET | Refills: 0 | Status: SHIPPED | OUTPATIENT
Start: 2022-01-25

## 2022-01-25 RX ORDER — KETOROLAC TROMETHAMINE 30 MG/ML
30 INJECTION, SOLUTION INTRAMUSCULAR; INTRAVENOUS
Status: COMPLETED | OUTPATIENT
Start: 2022-01-25 | End: 2022-01-25

## 2022-01-25 RX ORDER — METHOCARBAMOL 500 MG/1
500 TABLET, FILM COATED ORAL
Qty: 20 TABLET | Refills: 0 | Status: SHIPPED | OUTPATIENT
Start: 2022-01-25

## 2022-01-25 RX ORDER — LIDOCAINE 50 MG/G
PATCH TOPICAL
Qty: 15 EACH | Refills: 0 | Status: SHIPPED | OUTPATIENT
Start: 2022-01-25

## 2022-01-25 RX ORDER — METHOCARBAMOL 500 MG/1
500 TABLET, FILM COATED ORAL
Status: COMPLETED | OUTPATIENT
Start: 2022-01-25 | End: 2022-01-25

## 2022-01-25 RX ORDER — ACETAMINOPHEN 500 MG
1000 TABLET ORAL
Status: COMPLETED | OUTPATIENT
Start: 2022-01-25 | End: 2022-01-25

## 2022-01-25 RX ADMIN — ACETAMINOPHEN 1000 MG: 500 TABLET ORAL at 15:54

## 2022-01-25 RX ADMIN — METHOCARBAMOL TABLETS 500 MG: 500 TABLET, COATED ORAL at 15:55

## 2022-01-25 RX ADMIN — KETOROLAC TROMETHAMINE 30 MG: 30 INJECTION, SOLUTION INTRAMUSCULAR; INTRAVENOUS at 15:54
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
## 2022-05-09 ENCOUNTER — TRANSCRIBE ORDER (OUTPATIENT)
Dept: SCHEDULING | Age: 62
End: 2022-05-09

## 2022-05-09 DIAGNOSIS — Z12.31 VISIT FOR SCREENING MAMMOGRAM: Primary | ICD-10-CM

## 2023-03-10 ENCOUNTER — TRANSCRIBE ORDER (OUTPATIENT)
Dept: SCHEDULING | Age: 63
End: 2023-03-10

## 2023-03-10 DIAGNOSIS — M17.10 UNILATERAL PRIMARY OSTEOARTHRITIS, UNSPECIFIED KNEE: ICD-10-CM

## 2023-03-10 DIAGNOSIS — M79.18 MYALGIA, OTHER SITE: ICD-10-CM

## 2023-03-10 DIAGNOSIS — G62.89 OTHER SPECIFIED POLYNEUROPATHIES: ICD-10-CM

## 2023-03-10 DIAGNOSIS — M47.816 SPONDYLOSIS WITHOUT MYELOPATHY OR RADICULOPATHY, LUMBAR REGION: ICD-10-CM

## 2023-03-10 DIAGNOSIS — M51.37 OTHER INTERVERTEBRAL DISC DEGENERATION, LUMBOSACRAL REGION: Primary | ICD-10-CM

## 2023-04-04 ENCOUNTER — HOSPITAL ENCOUNTER (OUTPATIENT)
Age: 63
End: 2023-04-04
Attending: EMERGENCY MEDICINE
Payer: MEDICARE

## 2023-04-04 ENCOUNTER — HOSPITAL ENCOUNTER (OUTPATIENT)
Dept: MRI IMAGING | Age: 63
End: 2023-04-04
Attending: PHYSICIAN ASSISTANT
Payer: MEDICARE

## 2023-04-04 VITALS
HEART RATE: 68 BPM | HEIGHT: 60 IN | RESPIRATION RATE: 16 BRPM | TEMPERATURE: 98.1 F | SYSTOLIC BLOOD PRESSURE: 143 MMHG | DIASTOLIC BLOOD PRESSURE: 81 MMHG | WEIGHT: 205 LBS | BODY MASS INDEX: 40.25 KG/M2 | OXYGEN SATURATION: 99 %

## 2023-04-04 PROCEDURE — 99282 EMERGENCY DEPT VISIT SF MDM: CPT

## 2023-04-04 PROCEDURE — A9576 INJ PROHANCE MULTIPACK: HCPCS | Performed by: RADIOLOGY

## 2023-04-04 PROCEDURE — 72158 MRI LUMBAR SPINE W/O & W/DYE: CPT

## 2023-04-04 PROCEDURE — 74011250636 HC RX REV CODE- 250/636: Performed by: RADIOLOGY

## 2023-04-04 RX ADMIN — GADOTERIDOL 18 ML: 279.3 INJECTION, SOLUTION INTRAVENOUS at 17:21

## 2023-04-04 NOTE — ED PROVIDER NOTES
Tom Peña is a 67 yo F with intermittent nose bleeds today and coughing up blood. She states she was not coughing until after the nose bleed started. It had stopped initially and then started again when she was in outpatient MRI. She put tissue in her nose and the bleeding stopped but she was concerned but the coughing of blood. Past Medical History:   Diagnosis Date    Anxiety     Endocrine disease        Past Surgical History:   Procedure Laterality Date    HX GASTRIC BYPASS  2000         No family history on file. Social History     Socioeconomic History    Marital status:      Spouse name: Not on file    Number of children: Not on file    Years of education: Not on file    Highest education level: Not on file   Occupational History    Not on file   Tobacco Use    Smoking status: Never    Smokeless tobacco: Not on file   Substance and Sexual Activity    Alcohol use: Yes     Comment: ocassionally     Drug use: Not on file    Sexual activity: Not on file   Other Topics Concern    Not on file   Social History Narrative    Not on file     Social Determinants of Health     Financial Resource Strain: Not on file   Food Insecurity: Not on file   Transportation Needs: Not on file   Physical Activity: Not on file   Stress: Not on file   Social Connections: Not on file   Intimate Partner Violence: Not on file   Housing Stability: Not on file         ALLERGIES: Patient has no known allergies. Review of Systems   Constitutional:  Negative for fever. HENT:  Positive for nosebleeds. Negative for sore throat. Eyes:  Negative for visual disturbance. Respiratory:  Positive for cough. Cardiovascular:  Negative for chest pain. Gastrointestinal:  Negative for abdominal pain. Genitourinary:  Negative for dysuria. Musculoskeletal:  Negative for back pain. Skin:  Negative for rash. Neurological:  Negative for headaches.      Vitals:    04/04/23 1815   BP: (!) 143/81   Pulse: 68   Resp: 16   Temp: 98.1 °F (36.7 °C)   SpO2: 99%   Weight: 93 kg (205 lb)   Height: 5' (1.524 m)            Physical Exam  Vitals and nursing note reviewed. Constitutional:       General: She is not in acute distress. Appearance: She is well-developed. HENT:      Head: Normocephalic and atraumatic. Nose:      Right Nostril: Epistaxis (dried blood at nare, no active bleeding.) present. Mouth/Throat:      Mouth: Mucous membranes are moist.   Eyes:      Extraocular Movements: Extraocular movements intact. Conjunctiva/sclera: Conjunctivae normal.   Neck:      Trachea: Phonation normal.   Cardiovascular:      Rate and Rhythm: Normal rate. Pulmonary:      Effort: Pulmonary effort is normal. No respiratory distress. Breath sounds: No wheezing or rales. Abdominal:      General: There is no distension. Musculoskeletal:         General: No tenderness. Normal range of motion. Cervical back: Normal range of motion. Skin:     General: Skin is warm and dry. Neurological:      General: No focal deficit present. Mental Status: She is alert. She is not disoriented. Motor: No abnormal muscle tone. Medical Decision Making  Concern for hemoptysis but following epistaxis. No prior history . Suspect due to post nasal blood. No active bleeding at this time. Provided epistaxis clamps.             Procedures

## 2023-04-04 NOTE — ED TRIAGE NOTES
Pt arrives to the ER for complaints of multiple nose bleeds that started this morning with some clots. Pt reports that later in the day, she began to cough up blood after the nose bleeding     Denies any weakness or fatigue.

## 2023-04-06 ENCOUNTER — HOSPITAL ENCOUNTER (OUTPATIENT)
Dept: GENERAL RADIOLOGY | Age: 63
End: 2023-04-06
Payer: MEDICARE

## 2023-04-06 ENCOUNTER — TRANSCRIBE ORDER (OUTPATIENT)
Dept: REGISTRATION | Age: 63
End: 2023-04-06

## 2023-04-06 PROCEDURE — 73564 X-RAY EXAM KNEE 4 OR MORE: CPT

## 2023-04-23 DIAGNOSIS — Z12.31 VISIT FOR SCREENING MAMMOGRAM: Primary | ICD-10-CM

## 2024-08-21 ENCOUNTER — TRANSCRIBE ORDERS (OUTPATIENT)
Facility: HOSPITAL | Age: 64
End: 2024-08-21

## 2024-08-21 DIAGNOSIS — Z12.31 SCREENING MAMMOGRAM FOR HIGH-RISK PATIENT: Primary | ICD-10-CM

## 2024-10-08 PROBLEM — M17.0 BILATERAL PRIMARY OSTEOARTHRITIS OF KNEE: Status: ACTIVE | Noted: 2024-10-08

## 2024-12-02 ENCOUNTER — HOSPITAL ENCOUNTER (OUTPATIENT)
Facility: HOSPITAL | Age: 64
Discharge: HOME | End: 2024-12-05
Payer: MEDICARE

## 2024-12-02 VITALS
RESPIRATION RATE: 20 BRPM | BODY MASS INDEX: 39.23 KG/M2 | HEART RATE: 81 BPM | DIASTOLIC BLOOD PRESSURE: 61 MMHG | SYSTOLIC BLOOD PRESSURE: 108 MMHG | HEIGHT: 60 IN | WEIGHT: 199.8 LBS | TEMPERATURE: 98 F

## 2024-12-02 LAB
ABO + RH BLD: NORMAL
ANION GAP SERPL CALC-SCNC: 6 MMOL/L (ref 2–12)
APPEARANCE UR: CLEAR
BACTERIA URNS QL MICRO: ABNORMAL /HPF
BASOPHILS # BLD: 0.1 K/UL (ref 0–0.1)
BASOPHILS NFR BLD: 1 % (ref 0–1)
BILIRUB UR QL: NEGATIVE
BLOOD GROUP ANTIBODIES SERPL: NORMAL
BUN SERPL-MCNC: 29 MG/DL (ref 6–20)
BUN/CREAT SERPL: 29 (ref 12–20)
CALCIUM SERPL-MCNC: 8.7 MG/DL (ref 8.5–10.1)
CHLORIDE SERPL-SCNC: 111 MMOL/L (ref 97–108)
CO2 SERPL-SCNC: 22 MMOL/L (ref 21–32)
COLOR UR: ABNORMAL
CREAT SERPL-MCNC: 1.01 MG/DL (ref 0.55–1.02)
DIFFERENTIAL METHOD BLD: ABNORMAL
EKG ATRIAL RATE: 54 BPM
EKG DIAGNOSIS: NORMAL
EKG P-R INTERVAL: 134 MS
EKG Q-T INTERVAL: 418 MS
EKG QRS DURATION: 76 MS
EKG QTC CALCULATION (BAZETT): 396 MS
EKG R AXIS: 28 DEGREES
EKG T AXIS: 31 DEGREES
EKG VENTRICULAR RATE: 54 BPM
EOSINOPHIL # BLD: 0.1 K/UL (ref 0–0.4)
EOSINOPHIL NFR BLD: 2 % (ref 0–7)
EPITH CASTS URNS QL MICRO: ABNORMAL /LPF
ERYTHROCYTE [DISTWIDTH] IN BLOOD BY AUTOMATED COUNT: 13.4 % (ref 11.5–14.5)
EST. AVERAGE GLUCOSE BLD GHB EST-MCNC: 97 MG/DL
GLUCOSE SERPL-MCNC: 75 MG/DL (ref 65–100)
GLUCOSE UR STRIP.AUTO-MCNC: NEGATIVE MG/DL
HBA1C MFR BLD: 5 % (ref 4–5.6)
HCT VFR BLD AUTO: 39.2 % (ref 35–47)
HGB BLD-MCNC: 12.3 G/DL (ref 11.5–16)
HGB UR QL STRIP: NEGATIVE
IMM GRANULOCYTES # BLD AUTO: 0 K/UL (ref 0–0.04)
IMM GRANULOCYTES NFR BLD AUTO: 0 % (ref 0–0.5)
INR PPP: 1 (ref 0.9–1.1)
KETONES UR QL STRIP.AUTO: NEGATIVE MG/DL
LEUKOCYTE ESTERASE UR QL STRIP.AUTO: NEGATIVE
LYMPHOCYTES # BLD: 2.1 K/UL (ref 0.8–3.5)
LYMPHOCYTES NFR BLD: 42 % (ref 12–49)
MCH RBC QN AUTO: 30.7 PG (ref 26–34)
MCHC RBC AUTO-ENTMCNC: 31.4 G/DL (ref 30–36.5)
MCV RBC AUTO: 97.8 FL (ref 80–99)
MONOCYTES # BLD: 0.5 K/UL (ref 0–1)
MONOCYTES NFR BLD: 10 % (ref 5–13)
NEUTS SEG # BLD: 2.2 K/UL (ref 1.8–8)
NEUTS SEG NFR BLD: 45 % (ref 32–75)
NITRITE UR QL STRIP.AUTO: POSITIVE
NRBC # BLD: 0 K/UL (ref 0–0.01)
NRBC BLD-RTO: 0 PER 100 WBC
PH UR STRIP: 6 (ref 5–8)
PLATELET # BLD AUTO: 210 K/UL (ref 150–400)
PMV BLD AUTO: 13 FL (ref 8.9–12.9)
POTASSIUM SERPL-SCNC: 4.1 MMOL/L (ref 3.5–5.1)
PROT UR STRIP-MCNC: NEGATIVE MG/DL
PROTHROMBIN TIME: 10.8 SEC (ref 9–11.1)
RBC # BLD AUTO: 4.01 M/UL (ref 3.8–5.2)
RBC #/AREA URNS HPF: ABNORMAL /HPF (ref 0–5)
SODIUM SERPL-SCNC: 139 MMOL/L (ref 136–145)
SP GR UR REFRACTOMETRY: 1.01 (ref 1–1.03)
SPECIMEN EXP DATE BLD: NORMAL
URINE CULTURE IF INDICATED: ABNORMAL
UROBILINOGEN UR QL STRIP.AUTO: 0.2 EU/DL (ref 0.2–1)
WBC # BLD AUTO: 5 K/UL (ref 3.6–11)
WBC URNS QL MICRO: ABNORMAL /HPF (ref 0–4)

## 2024-12-02 PROCEDURE — 81001 URINALYSIS AUTO W/SCOPE: CPT

## 2024-12-02 PROCEDURE — 36415 COLL VENOUS BLD VENIPUNCTURE: CPT

## 2024-12-02 PROCEDURE — 86850 RBC ANTIBODY SCREEN: CPT

## 2024-12-02 PROCEDURE — 87086 URINE CULTURE/COLONY COUNT: CPT

## 2024-12-02 PROCEDURE — 93005 ELECTROCARDIOGRAM TRACING: CPT | Performed by: ORTHOPAEDIC SURGERY

## 2024-12-02 PROCEDURE — 87088 URINE BACTERIA CULTURE: CPT

## 2024-12-02 PROCEDURE — 85610 PROTHROMBIN TIME: CPT

## 2024-12-02 PROCEDURE — 86900 BLOOD TYPING SEROLOGIC ABO: CPT

## 2024-12-02 PROCEDURE — 87186 SC STD MICRODIL/AGAR DIL: CPT

## 2024-12-02 PROCEDURE — 85025 COMPLETE CBC W/AUTO DIFF WBC: CPT

## 2024-12-02 PROCEDURE — 80048 BASIC METABOLIC PNL TOTAL CA: CPT

## 2024-12-02 PROCEDURE — 86901 BLOOD TYPING SEROLOGIC RH(D): CPT

## 2024-12-02 PROCEDURE — APPNB30 APP NON BILLABLE TIME 0-30 MINS: Performed by: NURSE PRACTITIONER

## 2024-12-02 PROCEDURE — 83036 HEMOGLOBIN GLYCOSYLATED A1C: CPT

## 2024-12-02 RX ORDER — LORATADINE 10 MG/1
10 TABLET ORAL AS NEEDED
COMMUNITY

## 2024-12-02 RX ORDER — ERGOCALCIFEROL 1.25 MG/1
50000 CAPSULE, LIQUID FILLED ORAL WEEKLY
COMMUNITY

## 2024-12-02 RX ORDER — LOPERAMIDE HYDROCHLORIDE 2 MG/1
2 CAPSULE ORAL AS NEEDED
COMMUNITY

## 2024-12-02 ASSESSMENT — PROMIS GLOBAL HEALTH SCALE
IN GENERAL, WOULD YOU SAY YOUR QUALITY OF LIFE IS...[ON A SCALE OF 1 (POOR) TO 5 (EXCELLENT)]: GOOD
IN THE PAST 7 DAYS, HOW WOULD YOU RATE YOUR PAIN ON AVERAGE [ON A SCALE FROM 0 (NO PAIN) TO 10 (WORST IMAGINABLE PAIN)]?: 10 WORST IMAGINABLE PAIN
TO WHAT EXTENT ARE YOU ABLE TO CARRY OUT YOUR EVERYDAY PHYSICAL ACTIVITIES SUCH AS WALKING, CLIMBING STAIRS, CARRYING GROCERIES, OR MOVING A CHAIR [ON A SCALE OF 1 (NOT AT ALL) TO 5 (COMPLETELY)]?: A LITTLE
IN GENERAL, HOW WOULD YOU RATE YOUR SATISFACTION WITH YOUR SOCIAL ACTIVITIES AND RELATIONSHIPS [ON A SCALE OF 1 (POOR) TO 5 (EXCELLENT)]?: FAIR
IN GENERAL, HOW WOULD YOU RATE YOUR MENTAL HEALTH, INCLUDING YOUR MOOD AND YOUR ABILITY TO THINK [ON A SCALE OF 1 (POOR) TO 5 (EXCELLENT)]?: GOOD
IN GENERAL, PLEASE RATE HOW WELL YOU CARRY OUT YOUR USUAL SOCIAL ACTIVITIES (INCLUDES ACTIVITIES AT HOME, AT WORK, AND IN YOUR COMMUNITY, AND RESPONSIBILITIES AS A PARENT, CHILD, SPOUSE, EMPLOYEE, FRIEND, ETC) [ON A SCALE OF 1 (POOR) TO 5 (EXCELLENT)]?: VERY GOOD
WHO IS THE PERSON COMPLETING THE PROMIS V1.1 SURVEY?: SELF
SUM OF RESPONSES TO QUESTIONS 2, 4, 5, & 10: 10
IN GENERAL, HOW WOULD YOU RATE YOUR PHYSICAL HEALTH [ON A SCALE OF 1 (POOR) TO 5 (EXCELLENT)]?: GOOD
IN GENERAL, WOULD YOU SAY YOUR HEALTH IS...[ON A SCALE OF 1 (POOR) TO 5 (EXCELLENT)]: GOOD
SUM OF RESPONSES TO QUESTIONS 3, 6, 7, & 8: 17
IN THE PAST 7 DAYS, HOW OFTEN HAVE YOU BEEN BOTHERED BY EMOTIONAL PROBLEMS, SUCH AS FEELING ANXIOUS, DEPRESSED, OR IRRITABLE [ON A SCALE FROM 1 (NEVER) TO 5 (ALWAYS)]?: OFTEN
IN THE PAST 7 DAYS, HOW WOULD YOU RATE YOUR FATIGUE ON AVERAGE [ON A SCALE FROM 1 (NONE) TO 5 (VERY SEVERE)]?: SEVERE
HOW IS THE PROMIS V1.1 BEING ADMINISTERED?: PAPER

## 2024-12-02 ASSESSMENT — KOOS JR
RISING FROM SITTING: SEVERE
HOW SEVERE IS YOUR KNEE STIFFNESS AFTER FIRST WAKING IN MORNING: EXTREME
BENDING TO THE FLOOR TO PICK UP OBJECT: SEVERE
STANDING UPRIGHT: SEVERE
TWISING OR PIVOTING ON KNEE: EXTREME
KOOS JR TOTAL INTERVAL SCORE: 20.941
GOING UP OR DOWN STAIRS: EXTREME
STRAIGHTENING KNEE FULLY: EXTREME

## 2024-12-02 ASSESSMENT — PAIN DESCRIPTION - DESCRIPTORS: DESCRIPTORS: ACHING

## 2024-12-02 ASSESSMENT — PAIN DESCRIPTION - ORIENTATION: ORIENTATION: LOWER;RIGHT;LEFT

## 2024-12-02 ASSESSMENT — PAIN DESCRIPTION - LOCATION: LOCATION: BACK;KNEE;HIP

## 2024-12-03 LAB
BACTERIA SPEC CULT: NORMAL
BACTERIA SPEC CULT: NORMAL
SERVICE CMNT-IMP: NORMAL

## 2024-12-04 RX ORDER — SULFAMETHOXAZOLE AND TRIMETHOPRIM 800; 160 MG/1; MG/1
1 TABLET ORAL 2 TIMES DAILY
Qty: 10 TABLET | Refills: 0 | Status: SHIPPED | OUTPATIENT
Start: 2024-12-04 | End: 2024-12-09

## 2024-12-04 RX ORDER — MUPIROCIN 20 MG/G
OINTMENT TOPICAL 2 TIMES DAILY
Qty: 1 G | Refills: 0 | Status: SHIPPED | OUTPATIENT
Start: 2024-12-04 | End: 2024-12-09

## 2024-12-04 NOTE — PERIOP NOTE
PC to pt, full name and  verified, regarding positive nasal cx (MSSA) and need to start Mupirocin ointment BID x 5 days to B nostrils starting today and bathe with CHG soap for 5 days prior to surgery. Pt verbalized understanding of instructions and will start today as recommended. Allergies and pharmacy of choice reviewed. Rx escribed to pt's pharmacy of choice.  PTA medlist updated. Surgeon and PCP notified of positive culture and treatment.     PRESCRIPTION:    MUPIROCIN 2% OINTMENT  QUANTITY:  #22 GRAMS  REFILLS: NONE    Apply 0.25 g (small pea-sized amount) to both nostrils twice a day for five days.    Urinalysis indicative of UTI and culture sent. Culture positive for E. coli. E-script for Bactrim 800/160 mg twice a day for 5 days starting today sent to pharmacy on file. PC made to patient, full name and  verified. Patient notified of prescription and possible side effects, and verbalized understanding of treatment regimen, goals of therapy, and UTI preventive measures. Provided contact info for further questions and reasons to follow up with PCP/surgeon, if needed. PCP and surgeon notified of treatment. PTA med list updated.      KAMALJIT PEOPLES - NP   
Chloride 12/02/2024 111 (H)  97 - 108 mmol/L Final    CO2 12/02/2024 22  21 - 32 mmol/L Final    Anion Gap 12/02/2024 6  2 - 12 mmol/L Final    PLEASE NOTE NEW REFERENCE RANGE    Glucose 12/02/2024 75  65 - 100 mg/dL Final    BUN 12/02/2024 29 (H)  6 - 20 MG/DL Final    Creatinine 12/02/2024 1.01  0.55 - 1.02 MG/DL Final    BUN/Creatinine Ratio 12/02/2024 29 (H)  12 - 20   Final    Est, Glom Filt Rate 12/02/2024 63  >60 ml/min/1.73m2 Final    Comment:    Pediatric calculator link: https://www.kidney.org/professionals/kdoqi/gfr_calculatorped     These results are not intended for use in patients <18 years of age.     eGFR results are calculated without a race factor using  the 2021 CKD-EPI equation. Careful clinical correlation is recommended, particularly when comparing to results calculated using previous equations.  The CKD-EPI equation is less accurate in patients with extremes of muscle mass, extra-renal metabolism of creatinine, excessive creatine ingestion, or following therapy that affects renal tubular secretion.      Calcium 12/02/2024 8.7  8.5 - 10.1 MG/DL Final    Special Requests 12/02/2024 NO SPECIAL REQUESTS    Final    Culture 12/02/2024 MRSA NOT PRESENT. Apparent Staphylococcus aureus (not MRSA noted).    Final    Culture 12/02/2024     Final                    Value:Screening of patient nares for MRSA is for surveillance purposes and, if positive, to facilitate isolation considerations in high risk settings. It is not intended for automatic decolonization interventions per se as regimens are not sufficiently effective to warrant routine use.      Ventricular Rate 12/02/2024 54  BPM Final    Atrial Rate 12/02/2024 54  BPM Final    P-R Interval 12/02/2024 134  ms Final    QRS Duration 12/02/2024 76  ms Final    Q-T Interval 12/02/2024 418  ms Final    QTc Calculation (Bazett) 12/02/2024 396  ms Final    R Axis 12/02/2024 28  degrees Final    T Axis 12/02/2024 31  degrees Final    Diagnosis 12/02/2024  
need to use nasal spray, clean the tip of the bottle with alcohol before use and do not use both at the same time.  If you are scheduled for COVID testing during the 5 days, do NOT apply morning dose until after the COVID test has been performed.        Follow all instructions so your surgery won’t be cancelled.  Please, be on time.                    If a situation occurs and you are delayed the day of surgery, call [phone]/ [phone].    If your physical condition changes (like a fever, cold, flu, etc.) call your surgeon as soon as possible.    Home medication(s) reviewed and verified via during PAT appointment/call.    The patient was contacted in person.     She verbalized understanding of all instructions does not need reinforcement.

## 2024-12-05 LAB
BACTERIA SPEC CULT: ABNORMAL
CC UR VC: ABNORMAL
SERVICE CMNT-IMP: ABNORMAL

## 2024-12-06 PROBLEM — Z01.818 ENCOUNTER FOR PREADMISSION TESTING: Status: ACTIVE | Noted: 2024-12-06

## 2024-12-06 NOTE — DISCHARGE INSTRUCTIONS
Discharge Instructions Knee Replacement  Dr. Jordan      Patient Name  Suzie Farrell  Procedure  Procedure(s):  LEFT TOTAL KNEE ARTHROPLASTY  Surgeon  Surgeons and Role:     * Natanael Jordan MD - Primary    Follow up care  Follow up visit with Dr. Jordan in 3 weeks.  Call [phone]  to make an appointment  If you have staples in your knee incision, they will be taken out in 10-14 days by Home Health Care staff.    Activity at home  Take a short walk every hour; except at night when sleeping  Do your Home Exercise Program 3 times every day   After exercising lie down and elevate your leg on pillows for 15-30 minutes to decrease swelling  Refer to your patient notebook for more information    Bathing and caring for your incision  You may take a shower with your waterproof dressing on your knee.  The waterproof dressing is to stay on your knee for 7 days.  On the 7th day have someone gently peel the dressing off by lifting the edge and stretching it to break the seal.  You may then leave your incision open to air unless you see drainage from your knee.      Preventing blood clots  Take Eliquis every day as prescribed.  Call Dr. Jordan if you have side effects of blood thinning medication: bleeding, bruising, upset stomach, or diarrhea.   Call Dr. Jordan for signs of a blood clot in your leg: calf pain, tenderness, redness, swelling of lower leg    Preventing lung congestion  Use your incentive spirometer 4 times a day; do 10 repetitions each time  Remember to keep the small blue ball between the two arrows when taking a slow, deep breath     Pain Management  Get up and walk a short distance to relieve pain and stiffness.  Place ice wrap on your knee except when you are walking. The gel ice packs should be changed about every 4 hours  Elevate your leg on pillows for 15-30 minutes   Take Tylenol 650mg (take two 325mg tablets) every 6 hours for pain.  If needed, take a narcotic pain pill every 4-6

## 2024-12-06 NOTE — H&P
Suzie Farrell (: 1960) is a 64 y.o. female, patient, here for evaluation of the following chief complaint(s):  Knee Pain (Bilateral knee pain - \"bone on bone\" over 12 years )        HPI:     Longstanding history of severe DJD both knees.  Patient has been treated thus far with activity modification, weight loss, injectables.  Has used medications.  Currently twice a day diclofenac.  Also in pain management.  She has severe DJD both knees.  Longstanding history of pain.  Here today for consult regarding knee replacement.     Cannot walk more than a block or so without severe pain.  Has had to significantly reduce her activity secondary to her knee pain.     Allergies        Allergies   Allergen Reactions    Aspirin              Current Facility-Administered Medications          Current Outpatient Medications   Medication Sig Dispense Refill    diclofenac sodium (VOLTAREN) 1 % GEL          topiramate (TOPAMAX) 50 MG tablet          tiZANidine (ZANAFLEX) 4 MG tablet TAKE 1 TABLET BY MOUTH TWICE DAILY AS NEEDED FOR MUSCLE SPASMS        PERCOCET  MG per tablet acetaminophen 325 MG / oxyCODONE hydrochloride 10 MG Oral Tablet [Percocet]    Start: 2016        meloxicam (MOBIC) 15 MG tablet          levothyroxine (SYNTHROID) 125 MCG tablet          hydrOXYzine HCl (ATARAX) 25 MG tablet          NEURONTIN 600 MG tablet Take 1 tablet by mouth.        furosemide (LASIX) 80 MG tablet furosemide 80 MG Oral Tablet    Start: 2016        fluticasone (FLONASE) 50 MCG/ACT nasal spray          FLUoxetine (PROZAC) 20 MG capsule          DULoxetine (CYMBALTA) 30 MG extended release capsule          busPIRone (BUSPAR) 5 MG tablet TAKE 1 TABLET BY MOUTH THREE TIMES DAILY FOR ANXIETY        ALPRAZolam (XANAX) 0.5 MG tablet Take 1 tablet by mouth.        HYDROcodone-acetaminophen (NORCO) 5-325 MG per tablet Take 1-2 tablets by mouth every 4 hours as needed.        lidocaine (LIDODERM) 5 % Apply patch to the affected

## 2024-12-10 ENCOUNTER — ANESTHESIA EVENT (OUTPATIENT)
Facility: HOSPITAL | Age: 64
End: 2024-12-10
Payer: MEDICARE

## 2024-12-10 ENCOUNTER — HOSPITAL ENCOUNTER (INPATIENT)
Facility: HOSPITAL | Age: 64
LOS: 6 days | Discharge: HOME HEALTH | DRG: 470 | End: 2024-12-16
Attending: ORTHOPAEDIC SURGERY | Admitting: ORTHOPAEDIC SURGERY
Payer: MEDICARE

## 2024-12-10 ENCOUNTER — ANESTHESIA (OUTPATIENT)
Facility: HOSPITAL | Age: 64
End: 2024-12-10
Payer: MEDICARE

## 2024-12-10 DIAGNOSIS — Z96.652 S/P TKR (TOTAL KNEE REPLACEMENT), LEFT: Primary | ICD-10-CM

## 2024-12-10 PROBLEM — M17.12 ARTHRITIS OF LEFT KNEE: Status: ACTIVE | Noted: 2024-12-10

## 2024-12-10 LAB
GLUCOSE BLD STRIP.AUTO-MCNC: 84 MG/DL (ref 65–117)
SERVICE CMNT-IMP: NORMAL

## 2024-12-10 PROCEDURE — 2580000003 HC RX 258

## 2024-12-10 PROCEDURE — C1776 JOINT DEVICE (IMPLANTABLE): HCPCS | Performed by: ORTHOPAEDIC SURGERY

## 2024-12-10 PROCEDURE — 6360000002 HC RX W HCPCS: Performed by: STUDENT IN AN ORGANIZED HEALTH CARE EDUCATION/TRAINING PROGRAM

## 2024-12-10 PROCEDURE — 82962 GLUCOSE BLOOD TEST: CPT

## 2024-12-10 PROCEDURE — 2580000003 HC RX 258: Performed by: STUDENT IN AN ORGANIZED HEALTH CARE EDUCATION/TRAINING PROGRAM

## 2024-12-10 PROCEDURE — 3700000000 HC ANESTHESIA ATTENDED CARE: Performed by: ORTHOPAEDIC SURGERY

## 2024-12-10 PROCEDURE — 97116 GAIT TRAINING THERAPY: CPT

## 2024-12-10 PROCEDURE — 27447 TOTAL KNEE ARTHROPLASTY: CPT | Performed by: ORTHOPAEDIC SURGERY

## 2024-12-10 PROCEDURE — 2500000003 HC RX 250 WO HCPCS: Performed by: PHYSICIAN ASSISTANT

## 2024-12-10 PROCEDURE — 6370000000 HC RX 637 (ALT 250 FOR IP): Performed by: STUDENT IN AN ORGANIZED HEALTH CARE EDUCATION/TRAINING PROGRAM

## 2024-12-10 PROCEDURE — 7100000001 HC PACU RECOVERY - ADDTL 15 MIN: Performed by: ORTHOPAEDIC SURGERY

## 2024-12-10 PROCEDURE — 6360000002 HC RX W HCPCS

## 2024-12-10 PROCEDURE — 7100000000 HC PACU RECOVERY - FIRST 15 MIN: Performed by: ORTHOPAEDIC SURGERY

## 2024-12-10 PROCEDURE — 2580000003 HC RX 258: Performed by: PHYSICIAN ASSISTANT

## 2024-12-10 PROCEDURE — 2500000003 HC RX 250 WO HCPCS

## 2024-12-10 PROCEDURE — 2720000010 HC SURG SUPPLY STERILE: Performed by: ORTHOPAEDIC SURGERY

## 2024-12-10 PROCEDURE — 6370000000 HC RX 637 (ALT 250 FOR IP): Performed by: PHYSICIAN ASSISTANT

## 2024-12-10 PROCEDURE — 6360000002 HC RX W HCPCS: Performed by: NURSE PRACTITIONER

## 2024-12-10 PROCEDURE — 27447 TOTAL KNEE ARTHROPLASTY: CPT | Performed by: PHYSICIAN ASSISTANT

## 2024-12-10 PROCEDURE — 2580000003 HC RX 258: Performed by: ORTHOPAEDIC SURGERY

## 2024-12-10 PROCEDURE — C9290 INJ, BUPIVACAINE LIPOSOME: HCPCS | Performed by: ORTHOPAEDIC SURGERY

## 2024-12-10 PROCEDURE — 6360000002 HC RX W HCPCS: Performed by: PHYSICIAN ASSISTANT

## 2024-12-10 PROCEDURE — 97530 THERAPEUTIC ACTIVITIES: CPT

## 2024-12-10 PROCEDURE — 64447 NJX AA&/STRD FEMORAL NRV IMG: CPT | Performed by: STUDENT IN AN ORGANIZED HEALTH CARE EDUCATION/TRAINING PROGRAM

## 2024-12-10 PROCEDURE — 3700000001 HC ADD 15 MINUTES (ANESTHESIA): Performed by: ORTHOPAEDIC SURGERY

## 2024-12-10 PROCEDURE — 97161 PT EVAL LOW COMPLEX 20 MIN: CPT

## 2024-12-10 PROCEDURE — G0378 HOSPITAL OBSERVATION PER HR: HCPCS

## 2024-12-10 PROCEDURE — 0SRC0J9 REPLACEMENT OF RIGHT KNEE JOINT WITH SYNTHETIC SUBSTITUTE, CEMENTED, OPEN APPROACH: ICD-10-PCS | Performed by: ORTHOPAEDIC SURGERY

## 2024-12-10 PROCEDURE — 20985 CPTR-ASST DIR MS PX: CPT | Performed by: ORTHOPAEDIC SURGERY

## 2024-12-10 PROCEDURE — 3600000015 HC SURGERY LEVEL 5 ADDTL 15MIN: Performed by: ORTHOPAEDIC SURGERY

## 2024-12-10 PROCEDURE — 6360000002 HC RX W HCPCS: Performed by: ORTHOPAEDIC SURGERY

## 2024-12-10 PROCEDURE — 2709999900 HC NON-CHARGEABLE SUPPLY: Performed by: ORTHOPAEDIC SURGERY

## 2024-12-10 PROCEDURE — 2500000003 HC RX 250 WO HCPCS: Performed by: NURSE PRACTITIONER

## 2024-12-10 PROCEDURE — 1100000000 HC RM PRIVATE

## 2024-12-10 PROCEDURE — C1713 ANCHOR/SCREW BN/BN,TIS/BN: HCPCS | Performed by: ORTHOPAEDIC SURGERY

## 2024-12-10 PROCEDURE — 3600000005 HC SURGERY LEVEL 5 BASE: Performed by: ORTHOPAEDIC SURGERY

## 2024-12-10 DEVICE — IMPLANTABLE DEVICE
Type: IMPLANTABLE DEVICE | Site: KNEE | Status: FUNCTIONAL
Brand: PERSONA®

## 2024-12-10 DEVICE — IMPLANTABLE DEVICE
Type: IMPLANTABLE DEVICE | Site: KNEE | Status: FUNCTIONAL
Brand: PERSONA® VIVACIT-E®

## 2024-12-10 DEVICE — SMARTSET GHV GENTAMICIN HIGH VISCOSITY BONE CEMENT 40G
Type: IMPLANTABLE DEVICE | Site: KNEE | Status: FUNCTIONAL
Brand: SMARTSET

## 2024-12-10 DEVICE — IMPLANTABLE DEVICE
Type: IMPLANTABLE DEVICE | Site: KNEE | Status: FUNCTIONAL
Brand: PERSONA® NATURAL TIBIA®

## 2024-12-10 DEVICE — KIT KNEE IMPL CAP K1 HEMI CEM VE BEAR VE LNR K1VEVEZIMMERBIOMET: Type: IMPLANTABLE DEVICE | Site: KNEE | Status: FUNCTIONAL

## 2024-12-10 RX ORDER — SODIUM CHLORIDE 0.9 % (FLUSH) 0.9 %
5-40 SYRINGE (ML) INJECTION PRN
Status: DISCONTINUED | OUTPATIENT
Start: 2024-12-10 | End: 2024-12-16 | Stop reason: HOSPADM

## 2024-12-10 RX ORDER — 0.9 % SODIUM CHLORIDE 0.9 %
500 INTRAVENOUS SOLUTION INTRAVENOUS
Status: DISPENSED | OUTPATIENT
Start: 2024-12-10 | End: 2024-12-11

## 2024-12-10 RX ORDER — ONDANSETRON 4 MG/1
4 TABLET, ORALLY DISINTEGRATING ORAL EVERY 8 HOURS PRN
Status: DISCONTINUED | OUTPATIENT
Start: 2024-12-10 | End: 2024-12-16 | Stop reason: HOSPADM

## 2024-12-10 RX ORDER — SODIUM CHLORIDE 0.9 % (FLUSH) 0.9 %
5-40 SYRINGE (ML) INJECTION EVERY 12 HOURS SCHEDULED
Status: DISCONTINUED | OUTPATIENT
Start: 2024-12-10 | End: 2024-12-16 | Stop reason: HOSPADM

## 2024-12-10 RX ORDER — FENTANYL CITRATE 50 UG/ML
100 INJECTION, SOLUTION INTRAMUSCULAR; INTRAVENOUS
Status: DISCONTINUED | OUTPATIENT
Start: 2024-12-10 | End: 2024-12-10 | Stop reason: HOSPADM

## 2024-12-10 RX ORDER — SODIUM CHLORIDE, SODIUM LACTATE, POTASSIUM CHLORIDE, CALCIUM CHLORIDE 600; 310; 30; 20 MG/100ML; MG/100ML; MG/100ML; MG/100ML
INJECTION, SOLUTION INTRAVENOUS CONTINUOUS
Status: DISCONTINUED | OUTPATIENT
Start: 2024-12-10 | End: 2024-12-10 | Stop reason: HOSPADM

## 2024-12-10 RX ORDER — LEVOTHYROXINE SODIUM 125 UG/1
125 TABLET ORAL
Status: DISCONTINUED | OUTPATIENT
Start: 2024-12-10 | End: 2024-12-16 | Stop reason: HOSPADM

## 2024-12-10 RX ORDER — PROCHLORPERAZINE EDISYLATE 5 MG/ML
5 INJECTION INTRAMUSCULAR; INTRAVENOUS
Status: DISCONTINUED | OUTPATIENT
Start: 2024-12-10 | End: 2024-12-10 | Stop reason: HOSPADM

## 2024-12-10 RX ORDER — ONDANSETRON 2 MG/ML
4 INJECTION INTRAMUSCULAR; INTRAVENOUS
Status: DISCONTINUED | OUTPATIENT
Start: 2024-12-10 | End: 2024-12-10 | Stop reason: HOSPADM

## 2024-12-10 RX ORDER — OXYCODONE HYDROCHLORIDE 5 MG/1
10 TABLET ORAL
Status: DISCONTINUED | OUTPATIENT
Start: 2024-12-10 | End: 2024-12-11

## 2024-12-10 RX ORDER — LIDOCAINE HYDROCHLORIDE 10 MG/ML
1 INJECTION, SOLUTION EPIDURAL; INFILTRATION; INTRACAUDAL; PERINEURAL
Status: DISCONTINUED | OUTPATIENT
Start: 2024-12-10 | End: 2024-12-10 | Stop reason: HOSPADM

## 2024-12-10 RX ORDER — BUSPIRONE HYDROCHLORIDE 5 MG/1
5 TABLET ORAL DAILY
Status: DISCONTINUED | OUTPATIENT
Start: 2024-12-10 | End: 2024-12-16 | Stop reason: HOSPADM

## 2024-12-10 RX ORDER — TOPIRAMATE 25 MG/1
50 TABLET, FILM COATED ORAL 2 TIMES DAILY
Status: DISCONTINUED | OUTPATIENT
Start: 2024-12-10 | End: 2024-12-16 | Stop reason: HOSPADM

## 2024-12-10 RX ORDER — SODIUM CHLORIDE 0.9 % (FLUSH) 0.9 %
5-40 SYRINGE (ML) INJECTION PRN
Status: DISCONTINUED | OUTPATIENT
Start: 2024-12-10 | End: 2024-12-10 | Stop reason: HOSPADM

## 2024-12-10 RX ORDER — NALOXONE HYDROCHLORIDE 0.4 MG/ML
INJECTION, SOLUTION INTRAMUSCULAR; INTRAVENOUS; SUBCUTANEOUS PRN
Status: DISCONTINUED | OUTPATIENT
Start: 2024-12-10 | End: 2024-12-10 | Stop reason: HOSPADM

## 2024-12-10 RX ORDER — OXYCODONE HYDROCHLORIDE 5 MG/1
5 TABLET ORAL
Status: DISCONTINUED | OUTPATIENT
Start: 2024-12-10 | End: 2024-12-11

## 2024-12-10 RX ORDER — PANTOPRAZOLE SODIUM 40 MG/1
40 TABLET, DELAYED RELEASE ORAL
Status: DISCONTINUED | OUTPATIENT
Start: 2024-12-10 | End: 2024-12-16 | Stop reason: HOSPADM

## 2024-12-10 RX ORDER — GABAPENTIN 600 MG/1
600 TABLET ORAL 3 TIMES DAILY
Status: DISCONTINUED | OUTPATIENT
Start: 2024-12-10 | End: 2024-12-16 | Stop reason: HOSPADM

## 2024-12-10 RX ORDER — SODIUM CHLORIDE 0.9 % (FLUSH) 0.9 %
5-40 SYRINGE (ML) INJECTION EVERY 12 HOURS SCHEDULED
Status: DISCONTINUED | OUTPATIENT
Start: 2024-12-10 | End: 2024-12-10 | Stop reason: HOSPADM

## 2024-12-10 RX ORDER — BISACODYL 5 MG/1
5 TABLET, DELAYED RELEASE ORAL DAILY
Status: DISCONTINUED | OUTPATIENT
Start: 2024-12-10 | End: 2024-12-16 | Stop reason: HOSPADM

## 2024-12-10 RX ORDER — MORPHINE SULFATE 2 MG/ML
2 INJECTION, SOLUTION INTRAMUSCULAR; INTRAVENOUS
Status: DISCONTINUED | OUTPATIENT
Start: 2024-12-10 | End: 2024-12-11

## 2024-12-10 RX ORDER — HYDRALAZINE HYDROCHLORIDE 20 MG/ML
10 INJECTION INTRAMUSCULAR; INTRAVENOUS
Status: DISCONTINUED | OUTPATIENT
Start: 2024-12-10 | End: 2024-12-10 | Stop reason: HOSPADM

## 2024-12-10 RX ORDER — ACETAMINOPHEN 500 MG
1000 TABLET ORAL ONCE
Status: COMPLETED | OUTPATIENT
Start: 2024-12-10 | End: 2024-12-10

## 2024-12-10 RX ORDER — SODIUM CHLORIDE 9 MG/ML
INJECTION, SOLUTION INTRAVENOUS PRN
Status: DISCONTINUED | OUTPATIENT
Start: 2024-12-10 | End: 2024-12-10 | Stop reason: HOSPADM

## 2024-12-10 RX ORDER — HYDROXYZINE HYDROCHLORIDE 10 MG/1
10 TABLET, FILM COATED ORAL EVERY 8 HOURS PRN
Status: DISCONTINUED | OUTPATIENT
Start: 2024-12-10 | End: 2024-12-16 | Stop reason: HOSPADM

## 2024-12-10 RX ORDER — MIDAZOLAM HYDROCHLORIDE 2 MG/2ML
2 INJECTION, SOLUTION INTRAMUSCULAR; INTRAVENOUS PRN
Status: DISCONTINUED | OUTPATIENT
Start: 2024-12-10 | End: 2024-12-10 | Stop reason: HOSPADM

## 2024-12-10 RX ORDER — CEFAZOLIN SODIUM 1 G/3ML
INJECTION, POWDER, FOR SOLUTION INTRAMUSCULAR; INTRAVENOUS
Status: DISCONTINUED | OUTPATIENT
Start: 2024-12-10 | End: 2024-12-10 | Stop reason: SDUPTHER

## 2024-12-10 RX ORDER — ONDANSETRON 2 MG/ML
4 INJECTION INTRAMUSCULAR; INTRAVENOUS EVERY 6 HOURS PRN
Status: DISCONTINUED | OUTPATIENT
Start: 2024-12-10 | End: 2024-12-16 | Stop reason: HOSPADM

## 2024-12-10 RX ORDER — OXYCODONE HYDROCHLORIDE 5 MG/1
5 TABLET ORAL
Status: DISCONTINUED | OUTPATIENT
Start: 2024-12-10 | End: 2024-12-10 | Stop reason: HOSPADM

## 2024-12-10 RX ORDER — ONDANSETRON 2 MG/ML
INJECTION INTRAMUSCULAR; INTRAVENOUS
Status: DISCONTINUED | OUTPATIENT
Start: 2024-12-10 | End: 2024-12-10 | Stop reason: SDUPTHER

## 2024-12-10 RX ORDER — SENNA AND DOCUSATE SODIUM 50; 8.6 MG/1; MG/1
1 TABLET, FILM COATED ORAL 2 TIMES DAILY
Status: DISCONTINUED | OUTPATIENT
Start: 2024-12-10 | End: 2024-12-16 | Stop reason: HOSPADM

## 2024-12-10 RX ORDER — EPHEDRINE SULFATE 50 MG/ML
INJECTION INTRAVENOUS
Status: DISCONTINUED | OUTPATIENT
Start: 2024-12-10 | End: 2024-12-10 | Stop reason: SDUPTHER

## 2024-12-10 RX ORDER — ROPIVACAINE HYDROCHLORIDE 5 MG/ML
INJECTION, SOLUTION EPIDURAL; INFILTRATION; PERINEURAL
Status: COMPLETED | OUTPATIENT
Start: 2024-12-10 | End: 2024-12-10

## 2024-12-10 RX ORDER — FUROSEMIDE 40 MG/1
40 TABLET ORAL EVERY OTHER DAY
Status: DISCONTINUED | OUTPATIENT
Start: 2024-12-10 | End: 2024-12-16 | Stop reason: HOSPADM

## 2024-12-10 RX ORDER — SODIUM CHLORIDE, SODIUM LACTATE, POTASSIUM CHLORIDE, CALCIUM CHLORIDE 600; 310; 30; 20 MG/100ML; MG/100ML; MG/100ML; MG/100ML
INJECTION, SOLUTION INTRAVENOUS
Status: DISCONTINUED | OUTPATIENT
Start: 2024-12-10 | End: 2024-12-10 | Stop reason: SDUPTHER

## 2024-12-10 RX ORDER — MIDAZOLAM HYDROCHLORIDE 1 MG/ML
INJECTION, SOLUTION INTRAMUSCULAR; INTRAVENOUS
Status: DISCONTINUED | OUTPATIENT
Start: 2024-12-10 | End: 2024-12-10 | Stop reason: SDUPTHER

## 2024-12-10 RX ORDER — ACETAMINOPHEN 500 MG
1000 TABLET ORAL EVERY 6 HOURS
Status: DISCONTINUED | OUTPATIENT
Start: 2024-12-10 | End: 2024-12-11

## 2024-12-10 RX ORDER — FENTANYL CITRATE 50 UG/ML
25 INJECTION, SOLUTION INTRAMUSCULAR; INTRAVENOUS EVERY 5 MIN PRN
Status: DISCONTINUED | OUTPATIENT
Start: 2024-12-10 | End: 2024-12-10 | Stop reason: HOSPADM

## 2024-12-10 RX ORDER — POLYETHYLENE GLYCOL 3350 17 G/17G
17 POWDER, FOR SOLUTION ORAL DAILY
Status: DISCONTINUED | OUTPATIENT
Start: 2024-12-10 | End: 2024-12-16 | Stop reason: HOSPADM

## 2024-12-10 RX ORDER — SODIUM CHLORIDE 9 MG/ML
INJECTION, SOLUTION INTRAVENOUS CONTINUOUS
Status: DISPENSED | OUTPATIENT
Start: 2024-12-10 | End: 2024-12-11

## 2024-12-10 RX ORDER — HYDROMORPHONE HYDROCHLORIDE 1 MG/ML
0.5 INJECTION, SOLUTION INTRAMUSCULAR; INTRAVENOUS; SUBCUTANEOUS EVERY 5 MIN PRN
Status: COMPLETED | OUTPATIENT
Start: 2024-12-10 | End: 2024-12-10

## 2024-12-10 RX ORDER — GLYCOPYRROLATE 0.2 MG/ML
INJECTION INTRAMUSCULAR; INTRAVENOUS
Status: DISCONTINUED | OUTPATIENT
Start: 2024-12-10 | End: 2024-12-10 | Stop reason: SDUPTHER

## 2024-12-10 RX ORDER — SODIUM CHLORIDE 9 MG/ML
INJECTION, SOLUTION INTRAVENOUS PRN
Status: DISCONTINUED | OUTPATIENT
Start: 2024-12-10 | End: 2024-12-16 | Stop reason: HOSPADM

## 2024-12-10 RX ORDER — FAMOTIDINE 20 MG/1
20 TABLET, FILM COATED ORAL 2 TIMES DAILY PRN
Status: DISCONTINUED | OUTPATIENT
Start: 2024-12-10 | End: 2024-12-16 | Stop reason: HOSPADM

## 2024-12-10 RX ORDER — HYDROXYZINE HYDROCHLORIDE 50 MG/1
25 TABLET, FILM COATED ORAL EVERY 8 HOURS PRN
Status: DISCONTINUED | OUTPATIENT
Start: 2024-12-10 | End: 2024-12-11

## 2024-12-10 RX ADMIN — FLUOXETINE HYDROCHLORIDE 20 MG: 20 CAPSULE ORAL at 13:28

## 2024-12-10 RX ADMIN — OXYCODONE HYDROCHLORIDE 10 MG: 5 TABLET ORAL at 11:55

## 2024-12-10 RX ADMIN — SODIUM CHLORIDE, POTASSIUM CHLORIDE, SODIUM LACTATE AND CALCIUM CHLORIDE: 600; 310; 30; 20 INJECTION, SOLUTION INTRAVENOUS at 07:35

## 2024-12-10 RX ADMIN — BUSPIRONE HYDROCHLORIDE 5 MG: 5 TABLET ORAL at 11:57

## 2024-12-10 RX ADMIN — SODIUM CHLORIDE, POTASSIUM CHLORIDE, SODIUM LACTATE AND CALCIUM CHLORIDE: 600; 310; 30; 20 INJECTION, SOLUTION INTRAVENOUS at 06:43

## 2024-12-10 RX ADMIN — ACETAMINOPHEN 1000 MG: 500 TABLET ORAL at 23:24

## 2024-12-10 RX ADMIN — FENTANYL CITRATE 25 MCG: 50 INJECTION INTRAMUSCULAR; INTRAVENOUS at 10:40

## 2024-12-10 RX ADMIN — ROPIVACAINE HYDROCHLORIDE 30 ML: 5 INJECTION, SOLUTION EPIDURAL; INFILTRATION; PERINEURAL at 07:20

## 2024-12-10 RX ADMIN — PROPOFOL 50 MCG/KG/MIN: 10 INJECTION, EMULSION INTRAVENOUS at 07:42

## 2024-12-10 RX ADMIN — MIDAZOLAM 2 MG: 1 INJECTION INTRAMUSCULAR; INTRAVENOUS at 07:35

## 2024-12-10 RX ADMIN — OXYCODONE HYDROCHLORIDE 10 MG: 5 TABLET ORAL at 23:23

## 2024-12-10 RX ADMIN — HYDROMORPHONE HYDROCHLORIDE 0.5 MG: 1 INJECTION, SOLUTION INTRAMUSCULAR; INTRAVENOUS; SUBCUTANEOUS at 10:25

## 2024-12-10 RX ADMIN — Medication 30 MG: at 07:39

## 2024-12-10 RX ADMIN — GABAPENTIN 600 MG: 600 TABLET, FILM COATED ORAL at 13:28

## 2024-12-10 RX ADMIN — ACETAMINOPHEN 1000 MG: 500 TABLET ORAL at 17:18

## 2024-12-10 RX ADMIN — TOPIRAMATE 50 MG: 25 TABLET, FILM COATED ORAL at 20:06

## 2024-12-10 RX ADMIN — PHENYLEPHRINE HYDROCHLORIDE 20 MCG/MIN: 10 INJECTION INTRAVENOUS at 07:55

## 2024-12-10 RX ADMIN — ONDANSETRON 4 MG: 2 INJECTION INTRAMUSCULAR; INTRAVENOUS at 08:52

## 2024-12-10 RX ADMIN — MEPIVACAINE HYDROCHLORIDE 45 MG: 15 INJECTION, SOLUTION EPIDURAL; INFILTRATION at 07:40

## 2024-12-10 RX ADMIN — TRANEXAMIC ACID 1000 MG: 100 INJECTION, SOLUTION INTRAVENOUS at 07:50

## 2024-12-10 RX ADMIN — SENNOSIDES AND DOCUSATE SODIUM 1 TABLET: 50; 8.6 TABLET ORAL at 11:57

## 2024-12-10 RX ADMIN — APIXABAN 2.5 MG: 5 TABLET, FILM COATED ORAL at 20:06

## 2024-12-10 RX ADMIN — CEFAZOLIN 2 G: 330 INJECTION, POWDER, FOR SOLUTION INTRAMUSCULAR; INTRAVENOUS at 08:15

## 2024-12-10 RX ADMIN — FENTANYL CITRATE 25 MCG: 50 INJECTION INTRAMUSCULAR; INTRAVENOUS at 10:20

## 2024-12-10 RX ADMIN — TOPIRAMATE 50 MG: 25 TABLET, FILM COATED ORAL at 11:57

## 2024-12-10 RX ADMIN — HYDROXYZINE HYDROCHLORIDE 10 MG: 50 TABLET, FILM COATED ORAL at 11:57

## 2024-12-10 RX ADMIN — WATER 2000 MG: 1 INJECTION INTRAMUSCULAR; INTRAVENOUS; SUBCUTANEOUS at 23:24

## 2024-12-10 RX ADMIN — ACETAMINOPHEN 1000 MG: 500 TABLET ORAL at 06:21

## 2024-12-10 RX ADMIN — FAMOTIDINE 20 MG: 20 TABLET, FILM COATED ORAL at 11:57

## 2024-12-10 RX ADMIN — HYDROMORPHONE HYDROCHLORIDE 0.5 MG: 1 INJECTION, SOLUTION INTRAMUSCULAR; INTRAVENOUS; SUBCUTANEOUS at 10:46

## 2024-12-10 RX ADMIN — FLUOXETINE HYDROCHLORIDE 20 MG: 20 CAPSULE ORAL at 20:06

## 2024-12-10 RX ADMIN — GLYCOPYRROLATE 0.2 MG: 0.2 INJECTION INTRAMUSCULAR; INTRAVENOUS at 07:50

## 2024-12-10 RX ADMIN — FENTANYL CITRATE 25 MCG: 50 INJECTION INTRAMUSCULAR; INTRAVENOUS at 11:00

## 2024-12-10 RX ADMIN — EPHEDRINE SULFATE 10 MG: 50 INJECTION INTRAVENOUS at 09:57

## 2024-12-10 RX ADMIN — OXYCODONE HYDROCHLORIDE 5 MG: 5 TABLET ORAL at 20:09

## 2024-12-10 RX ADMIN — SODIUM CHLORIDE, PRESERVATIVE FREE 10 ML: 5 INJECTION INTRAVENOUS at 20:07

## 2024-12-10 RX ADMIN — GABAPENTIN 600 MG: 600 TABLET, FILM COATED ORAL at 20:06

## 2024-12-10 RX ADMIN — FUROSEMIDE 40 MG: 40 TABLET ORAL at 11:57

## 2024-12-10 RX ADMIN — WATER 2000 MG: 1 INJECTION INTRAMUSCULAR; INTRAVENOUS; SUBCUTANEOUS at 15:34

## 2024-12-10 RX ADMIN — SENNOSIDES AND DOCUSATE SODIUM 1 TABLET: 50; 8.6 TABLET ORAL at 20:06

## 2024-12-10 RX ADMIN — Medication 20 MG: at 07:45

## 2024-12-10 RX ADMIN — MIDAZOLAM 2 MG: 1 INJECTION INTRAMUSCULAR; INTRAVENOUS at 07:27

## 2024-12-10 RX ADMIN — OXYCODONE HYDROCHLORIDE 10 MG: 5 TABLET ORAL at 16:02

## 2024-12-10 RX ADMIN — ACETAMINOPHEN 1000 MG: 500 TABLET ORAL at 11:55

## 2024-12-10 ASSESSMENT — PAIN SCALES - GENERAL
PAINLEVEL_OUTOF10: 8
PAINLEVEL_OUTOF10: 8
PAINLEVEL_OUTOF10: 6
PAINLEVEL_OUTOF10: 7
PAINLEVEL_OUTOF10: 10
PAINLEVEL_OUTOF10: 7
PAINLEVEL_OUTOF10: 10
PAINLEVEL_OUTOF10: 10
PAINLEVEL_OUTOF10: 7
PAINLEVEL_OUTOF10: 8
PAINLEVEL_OUTOF10: 8
PAINLEVEL_OUTOF10: 7
PAINLEVEL_OUTOF10: 7
PAINLEVEL_OUTOF10: 6
PAINLEVEL_OUTOF10: 7
PAINLEVEL_OUTOF10: 7
PAINLEVEL_OUTOF10: 5
PAINLEVEL_OUTOF10: 7

## 2024-12-10 ASSESSMENT — PAIN DESCRIPTION - LOCATION
LOCATION: KNEE
LOCATION: LEG;KNEE
LOCATION: KNEE

## 2024-12-10 ASSESSMENT — PAIN DESCRIPTION - ORIENTATION
ORIENTATION: LEFT

## 2024-12-10 ASSESSMENT — PAIN DESCRIPTION - DESCRIPTORS
DESCRIPTORS: ACHING
DESCRIPTORS: ACHING;DISCOMFORT
DESCRIPTORS: ACHING
DESCRIPTORS: ACHING
DESCRIPTORS: ACHING;DISCOMFORT
DESCRIPTORS: ACHING
DESCRIPTORS: ACHING;DISCOMFORT
DESCRIPTORS: ACHING

## 2024-12-10 ASSESSMENT — PAIN - FUNCTIONAL ASSESSMENT
PAIN_FUNCTIONAL_ASSESSMENT: ACTIVITIES ARE NOT PREVENTED
PAIN_FUNCTIONAL_ASSESSMENT: FACE, LEGS, ACTIVITY, CRY, AND CONSOLABILITY (FLACC)
PAIN_FUNCTIONAL_ASSESSMENT: 0-10

## 2024-12-10 ASSESSMENT — LIFESTYLE VARIABLES: SMOKING_STATUS: 1

## 2024-12-10 NOTE — ANESTHESIA PROCEDURE NOTES
Spinal Block    Patient location during procedure: OR  End time: 12/10/2024 7:45 AM  Reason for block: primary anesthetic  Staffing  Performed: anesthesiologist   Anesthesiologist: Aylin Quinteros DO  Performed by: Aylin Quinteros DO  Authorized by: Aylin Quinteros DO    Spinal Block  Patient position: sitting  Prep: DuraPrep  Patient monitoring: cardiac monitor, continuous pulse ox, frequent blood pressure checks and oxygen  Approach: midline  Location: L4/L5  Provider prep: mask and sterile gloves  Needle  Needle type: pencil-tip   Needle gauge: 25 G  Needle length: 4 in  Assessment  Sensory level: T4  Events: None  Swirl obtained: Yes  CSF: clear  Attempts: 1  Hemodynamics: stable  Preanesthetic Checklist  Completed: patient identified, IV checked, site marked, risks and benefits discussed, surgical/procedural consents, equipment checked, pre-op evaluation, timeout performed, anesthesia consent given, oxygen available, monitors applied/VS acknowledged and fire risk safety assessment completed and verbalized

## 2024-12-10 NOTE — ANESTHESIA PRE PROCEDURE
osteoarthritis of knee M17.0   • Encounter for preadmission testing Z01.818   • Arthritis of left knee M17.12       Past Medical History:        Diagnosis Date   • Anxiety    • Arthritis    • Endocrine disease    • Sciatica        Past Surgical History:        Procedure Laterality Date   • ABDOMINOPLASTY     • COLONOSCOPY     • FOOT SURGERY Right     BONE SPURS REMOVED   • GASTRIC BYPASS SURGERY  2000   • WISDOM TOOTH EXTRACTION         Social History:    Social History     Tobacco Use   • Smoking status: Never   • Smokeless tobacco: Never   Substance Use Topics   • Alcohol use: Yes     Comment: OCCASIONALLY                                Counseling given: Not Answered      Vital Signs (Current):   Vitals:    12/10/24 0612   BP: 121/73   Pulse: 65   Resp: 16   Temp: 98 °F (36.7 °C)   TempSrc: Oral   SpO2: 98%                                              BP Readings from Last 3 Encounters:   12/10/24 121/73   12/02/24 108/61   04/04/23 (!) 143/81       NPO Status: Time of last liquid consumption: 0230 (sips of water)                        Time of last solid consumption: 2300                        Date of last liquid consumption: 12/10/24                        Date of last solid food consumption: 12/09/24    BMI:   Wt Readings from Last 3 Encounters:   12/02/24 90.6 kg (199 lb 12.8 oz)   08/21/24 94.3 kg (208 lb)   04/04/23 93 kg (205 lb)     There is no height or weight on file to calculate BMI.    CBC:   Lab Results   Component Value Date/Time    WBC 5.0 12/02/2024 12:01 PM    RBC 4.01 12/02/2024 12:01 PM    HGB 12.3 12/02/2024 12:01 PM    HCT 39.2 12/02/2024 12:01 PM    MCV 97.8 12/02/2024 12:01 PM    RDW 13.4 12/02/2024 12:01 PM     12/02/2024 12:01 PM       CMP:   Lab Results   Component Value Date/Time     12/02/2024 12:01 PM    K 4.1 12/02/2024 12:01 PM     12/02/2024 12:01 PM    CO2 22 12/02/2024 12:01 PM    BUN 29 12/02/2024 12:01 PM    CREATININE 1.01 12/02/2024 12:01 PM    LABGLOM 63

## 2024-12-10 NOTE — OP NOTE
Name: Suzie Farrell  MRN:  [id number]  : 1960  Age:  64 y.o.  Surgery Date: 12/10/2024      OPERATIVE REPORT - RIGHT TOTAL KNEE REPLACEMENT    PREOPERATIVE DIAGNOSIS: Osteoarthritis, right knee.    POSTOPERATIVE DIAGNOSIS: Osteoarthritis, right knee.    PROCEDURE PERFORMED: Right total knee arthroplasty.    SURGEON: Natanael Jordan MD    FIRST ASSISTANT:   uSzie Rivera PA-C    ANESTHESIA: Spinal    PRE-OP ANTIBIOTIC: Ancef 2g    COMPLICATIONS: None.    ESTIMATED BLOOD LOSS: 250 mL.    SPECIMENS REMOVED: None.    COMPONENTS IMPLANTED:   Implant Name Type Inv. Item Serial No.  Lot No. LRB No. Used Action   CEMENT BNE 40GM FULL DOSE PMMA W/ GENT HI VISC RADPQ LNG - SNA  CEMENT BNE 40GM FULL DOSE PMMA W/ GENT HI VISC RADPQ LNG NA [employer]- [id number] Left 2 Implanted   COMPONENT FEM SZ 7 STD L KNEE CO CHROM NERICO CRUCE RET COR - SNA  COMPONENT FEM SZ 7 STD L KNEE CO CHROM ENRICO CRUCE RET COR NA TERESA [employer] [id number] Left 1 Implanted   EXTENSION STEM SZ E 5DEG L TIBL KNEE ENRICO CHELLY TIB PERSONA - SNA  EXTENSION STEM SZ E 5DEG L TIBL KNEE ENRICO CHELLY TIB PERSONA NA TERESA [facility] [id number] Left 1 Implanted   COMPONENT PAT [id number] THK8.5MM STD KNEE VIVACIT-E ENRICO - SNA  COMPONENT PAT [id number] THK8.5MM STD KNEE VIVACIT-E ENRICO NA TERESA [facility] [id number] Left 1 Implanted   PSN MC VE ASF L 14MM 6-7/EF - SNA  PSN MC VE ASF L 14MM 6-7/EF NA TERESA [facility] [id number] Left 1 Implanted       INDICATIONS: The patient is an 64 yrs female with progressive debilitating right knee pain due to severe osteoarthritis. Symptoms have progressed despite comprehensive conservative treatment and the patient presents for right total knee replacement. Risks, benefits, alternatives of the procedure were reviewed in detail and the patient elects to proceed. The patient understands the risk for perioperative medical complications.      DESCRIPTION OF PROCEDURE:

## 2024-12-10 NOTE — ANESTHESIA POSTPROCEDURE EVALUATION
Department of Anesthesiology  Postprocedure Note    Patient: Suzie Farrell  MRN: [id number]  YOB: 1960  Date of evaluation: 12/10/2024    Procedure Summary       Date: 12/10/24 Room / Location: [facility] MAIN OR [address] [place] [address] MAIN OR    Anesthesia Start: 0735 Anesthesia Stop: 1008    Procedure: LEFT TOTAL KNEE ARTHROPLASTY (Left: Knee) Diagnosis:       Bilateral primary osteoarthritis of knee      (Bilateral primary osteoarthritis of knee [M17.0])    Providers: Natanael Jordan MD Responsible Provider: Aylin Quinteros DO    Anesthesia Type: MAC, Spinal ASA Status: 3            Anesthesia Type: MAC, Spinal    Jose Phase I: Jose Score: 10    Jose Phase II:      Anesthesia Post Evaluation    Patient location during evaluation: PACU  Patient participation: complete - patient participated  Level of consciousness: awake and alert  Pain score: 0  Airway patency: patent  Nausea & Vomiting: no nausea and no vomiting  Cardiovascular status: blood pressure returned to baseline and hemodynamically stable  Respiratory status: acceptable and room air  Hydration status: euvolemic  Multimodal analgesia pain management approach  Pain management: adequate and satisfactory to patient    No notable events documented.

## 2024-12-10 NOTE — ANESTHESIA PROCEDURE NOTES
Peripheral Block    Patient location during procedure: pre-op  Reason for block: post-op pain management and at surgeon's request  Start time: 12/10/2024 7:20 AM  End time: 12/10/2024 7:28 AM  Staffing  Performed: anesthesiologist   Anesthesiologist: Aylin Quinteros DO  Performed by: Aylin Quinteros DO  Authorized by: Aylin Quinteros DO    Preanesthetic Checklist  Completed: patient identified, IV checked, site marked, risks and benefits discussed, surgical/procedural consents, equipment checked, pre-op evaluation, timeout performed, anesthesia consent given, oxygen available, monitors applied/VS acknowledged and fire risk safety assessment completed and verbalized  Peripheral Block   Patient position: supine  Prep: ChloraPrep  Provider prep: mask and sterile gloves  Patient monitoring: cardiac monitor, continuous pulse ox, frequent blood pressure checks, IV access and oxygen  Block type: Saphenous  Laterality: left  Injection technique: single-shot  Guidance: ultrasound guidedDose: 30 mL    Needle   Needle type: insulated echogenic nerve stimulator needle   Needle gauge: 21 G  Needle localization: anatomical landmarks and ultrasound guidance  Needle length: 10 cm  Assessment   Injection assessment: negative aspiration for heme, no paresthesia on injection, local visualized surrounding nerve on ultrasound and no intravascular symptoms  Paresthesia pain: none  Slow fractionated injection: yes  Hemodynamics: stable  Outcomes: uncomplicated and patient tolerated procedure well    Medications Administered  ropivacaine (NAROPIN) injection 0.5% - Perineural   30 mL - 12/10/2024 7:20:00 AM

## 2024-12-10 NOTE — H&P
Update History & Physical    The patient's History and Physical of December 6, 2024 was reviewed with the patient and I examined the patient. There was no change. The surgical site was confirmed by the patient and me.       Plan: The risks, benefits, expected outcome, and alternative to the recommended procedure have been discussed with the patient. Patient understands and wants to proceed with the procedure.     Electronically signed by Natanael Jordan MD on 12/10/2024 at 7:28 AM

## 2024-12-11 LAB
ANION GAP SERPL CALC-SCNC: 2 MMOL/L (ref 2–12)
BUN SERPL-MCNC: 18 MG/DL (ref 6–20)
BUN/CREAT SERPL: 21 (ref 12–20)
CALCIUM SERPL-MCNC: 8.8 MG/DL (ref 8.5–10.1)
CHLORIDE SERPL-SCNC: 110 MMOL/L (ref 97–108)
CO2 SERPL-SCNC: 23 MMOL/L (ref 21–32)
CREAT SERPL-MCNC: 0.86 MG/DL (ref 0.55–1.02)
GLUCOSE SERPL-MCNC: 123 MG/DL (ref 65–100)
HCT VFR BLD AUTO: 30.6 % (ref 35–47)
HGB BLD-MCNC: 9.8 G/DL (ref 11.5–16)
POTASSIUM SERPL-SCNC: 5.2 MMOL/L (ref 3.5–5.1)
SODIUM SERPL-SCNC: 135 MMOL/L (ref 136–145)

## 2024-12-11 PROCEDURE — 6370000000 HC RX 637 (ALT 250 FOR IP): Performed by: PHYSICIAN ASSISTANT

## 2024-12-11 PROCEDURE — 97530 THERAPEUTIC ACTIVITIES: CPT

## 2024-12-11 PROCEDURE — 85014 HEMATOCRIT: CPT

## 2024-12-11 PROCEDURE — 80048 BASIC METABOLIC PNL TOTAL CA: CPT

## 2024-12-11 PROCEDURE — 1100000000 HC RM PRIVATE

## 2024-12-11 PROCEDURE — APPNB60 APP NON BILLABLE TIME 46-60 MINS: Performed by: NURSE PRACTITIONER

## 2024-12-11 PROCEDURE — 97116 GAIT TRAINING THERAPY: CPT

## 2024-12-11 PROCEDURE — 97535 SELF CARE MNGMENT TRAINING: CPT

## 2024-12-11 PROCEDURE — 97165 OT EVAL LOW COMPLEX 30 MIN: CPT

## 2024-12-11 PROCEDURE — 2580000003 HC RX 258: Performed by: PHYSICIAN ASSISTANT

## 2024-12-11 PROCEDURE — 85018 HEMOGLOBIN: CPT

## 2024-12-11 PROCEDURE — 6370000000 HC RX 637 (ALT 250 FOR IP): Performed by: NURSE PRACTITIONER

## 2024-12-11 RX ORDER — ACETAMINOPHEN 325 MG/1
650 TABLET ORAL EVERY 6 HOURS PRN
Status: DISCONTINUED | OUTPATIENT
Start: 2024-12-11 | End: 2024-12-16 | Stop reason: HOSPADM

## 2024-12-11 RX ORDER — OXYCODONE HYDROCHLORIDE 5 MG/1
5-10 TABLET ORAL EVERY 4 HOURS PRN
Qty: 42 TABLET | Refills: 0 | Status: SHIPPED | OUTPATIENT
Start: 2024-12-11 | End: 2024-12-12 | Stop reason: HOSPADM

## 2024-12-11 RX ORDER — SENNA AND DOCUSATE SODIUM 50; 8.6 MG/1; MG/1
1 TABLET, FILM COATED ORAL 2 TIMES DAILY
Qty: 60 TABLET | Refills: 0 | Status: SHIPPED
Start: 2024-12-11

## 2024-12-11 RX ORDER — OXYCODONE AND ACETAMINOPHEN 10; 325 MG/1; MG/1
1 TABLET ORAL EVERY 4 HOURS PRN
Status: DISCONTINUED | OUTPATIENT
Start: 2024-12-11 | End: 2024-12-16 | Stop reason: HOSPADM

## 2024-12-11 RX ORDER — OXYCODONE AND ACETAMINOPHEN 5; 325 MG/1; MG/1
1 TABLET ORAL EVERY 4 HOURS PRN
Status: DISCONTINUED | OUTPATIENT
Start: 2024-12-11 | End: 2024-12-16 | Stop reason: HOSPADM

## 2024-12-11 RX ADMIN — SODIUM CHLORIDE, PRESERVATIVE FREE 10 ML: 5 INJECTION INTRAVENOUS at 21:42

## 2024-12-11 RX ADMIN — PANTOPRAZOLE SODIUM 40 MG: 40 TABLET, DELAYED RELEASE ORAL at 06:08

## 2024-12-11 RX ADMIN — TOPIRAMATE 50 MG: 25 TABLET, FILM COATED ORAL at 09:18

## 2024-12-11 RX ADMIN — APIXABAN 2.5 MG: 5 TABLET, FILM COATED ORAL at 21:27

## 2024-12-11 RX ADMIN — BUSPIRONE HYDROCHLORIDE 5 MG: 5 TABLET ORAL at 09:18

## 2024-12-11 RX ADMIN — GABAPENTIN 600 MG: 600 TABLET, FILM COATED ORAL at 09:18

## 2024-12-11 RX ADMIN — SENNOSIDES AND DOCUSATE SODIUM 1 TABLET: 50; 8.6 TABLET ORAL at 09:18

## 2024-12-11 RX ADMIN — FLUOXETINE HYDROCHLORIDE 20 MG: 20 CAPSULE ORAL at 13:05

## 2024-12-11 RX ADMIN — OXYCODONE AND ACETAMINOPHEN 1 TABLET: 10; 325 TABLET ORAL at 21:32

## 2024-12-11 RX ADMIN — OXYCODONE HYDROCHLORIDE AND ACETAMINOPHEN 1 TABLET: 5; 325 TABLET ORAL at 15:55

## 2024-12-11 RX ADMIN — APIXABAN 2.5 MG: 5 TABLET, FILM COATED ORAL at 09:18

## 2024-12-11 RX ADMIN — OXYCODONE HYDROCHLORIDE 10 MG: 5 TABLET ORAL at 09:17

## 2024-12-11 RX ADMIN — GABAPENTIN 600 MG: 600 TABLET, FILM COATED ORAL at 21:27

## 2024-12-11 RX ADMIN — TOPIRAMATE 50 MG: 25 TABLET, FILM COATED ORAL at 21:26

## 2024-12-11 RX ADMIN — FLUOXETINE HYDROCHLORIDE 20 MG: 20 CAPSULE ORAL at 21:27

## 2024-12-11 RX ADMIN — POLYETHYLENE GLYCOL 3350 17 G: 17 POWDER, FOR SOLUTION ORAL at 09:17

## 2024-12-11 RX ADMIN — OXYCODONE HYDROCHLORIDE 10 MG: 5 TABLET ORAL at 06:09

## 2024-12-11 RX ADMIN — BISACODYL 5 MG: 5 TABLET, COATED ORAL at 09:18

## 2024-12-11 RX ADMIN — OXYCODONE HYDROCHLORIDE 10 MG: 5 TABLET ORAL at 13:05

## 2024-12-11 RX ADMIN — ACETAMINOPHEN 1000 MG: 500 TABLET ORAL at 06:08

## 2024-12-11 RX ADMIN — LEVOTHYROXINE SODIUM 125 MCG: 0.12 TABLET ORAL at 06:08

## 2024-12-11 RX ADMIN — GABAPENTIN 600 MG: 600 TABLET, FILM COATED ORAL at 13:05

## 2024-12-11 RX ADMIN — FLUOXETINE HYDROCHLORIDE 20 MG: 20 CAPSULE ORAL at 09:18

## 2024-12-11 RX ADMIN — SENNOSIDES AND DOCUSATE SODIUM 1 TABLET: 50; 8.6 TABLET ORAL at 21:26

## 2024-12-11 ASSESSMENT — PAIN - FUNCTIONAL ASSESSMENT
PAIN_FUNCTIONAL_ASSESSMENT: ACTIVITIES ARE NOT PREVENTED

## 2024-12-11 ASSESSMENT — PAIN DESCRIPTION - ORIENTATION
ORIENTATION: LEFT

## 2024-12-11 ASSESSMENT — PAIN SCALES - GENERAL
PAINLEVEL_OUTOF10: 9
PAINLEVEL_OUTOF10: 8
PAINLEVEL_OUTOF10: 8
PAINLEVEL_OUTOF10: 10
PAINLEVEL_OUTOF10: 10
PAINLEVEL_OUTOF10: 8
PAINLEVEL_OUTOF10: 5
PAINLEVEL_OUTOF10: 10
PAINLEVEL_OUTOF10: 3
PAINLEVEL_OUTOF10: 8
PAINLEVEL_OUTOF10: 4
PAINLEVEL_OUTOF10: 8

## 2024-12-11 ASSESSMENT — PAIN DESCRIPTION - LOCATION
LOCATION: KNEE

## 2024-12-11 ASSESSMENT — PAIN DESCRIPTION - DESCRIPTORS
DESCRIPTORS: ACHING

## 2024-12-11 ASSESSMENT — PAIN DESCRIPTION - ONSET: ONSET: ON-GOING

## 2024-12-11 ASSESSMENT — PAIN DESCRIPTION - FREQUENCY: FREQUENCY: INTERMITTENT

## 2024-12-11 ASSESSMENT — PAIN DESCRIPTION - PAIN TYPE: TYPE: ACUTE PAIN;SURGICAL PAIN

## 2024-12-11 NOTE — CARE COORDINATION
DICKSON:    Patient lives alone and would like to go to rehab. Pt does not qualify for IPR. CM discussed choice with Pt and sent referrals to [facility]:   [facility]  [facility]    CM also provided SNF list for Pt to review in case those listed cannot accept. AUTH will be needed.     Pt does not own walker nor does she have a history of HH or rehab; her sister and son are her support system. CM will follow for therapy reccs.     Transition of Care Plan:    RUR: 7%  Prior Level of Functioning: home indep  Disposition: snf  LEAH: 12/13  If SNF or IPR: Date FOC offered: 12/11  Date FOC received: 12/11  Accepting facility:   Date authorization started with reference number:   Date authorization received and expires:   Follow up appointments:   DME needed: none if snf  Transportation at discharge:   /[facility] Medicare/ letter given: y  Is patient a [place] and connected with VA?    If yes, was [place] transfer form completed and VA notified?   Caregiver Contact: [phone] Nora Farrell sister  Discharge Caregiver contacted prior to discharge?   Care Conference needed?   Barriers to discharge:  placement, auth

## 2024-12-11 NOTE — CARE COORDINATION
DICKSON:    Patient lives alone and would like to go to rehab; pending progress with therapy, interested in either IPR or SNF. Choices are as follows: AUTH will be needed    If IPR:  - [facility]    If SNF  [facility]  [facility]    Pt does not own walker nor does she have a history of HH or rehab; her sister and son are her support system. CM will follow for therapy reccs.     Care Management Initial Assessment       RUR: 6%  Readmission? No  1st IM letter given? Yes   1st  letter given: No   12/11/24 0922   Service Assessment   Patient Orientation Alert and Oriented   Cognition Alert   History Provided By Patient   Primary Caregiver Self   Support Systems Family Members   Patient's Healthcare Decision Maker is: Legal Next of Kin   PCP Verified by CM Yes   Prior Functional Level Independent in ADLs/IADLs   Current Functional Level Assistance with the following:;Mobility   Can patient return to prior living arrangement No   Ability to make needs known: Good   Family able to assist with home care needs: No   Social/Functional History   Type of Home Apartment   Discharge Planning   Patient expects to be discharged to: Acute rehab   Condition of Participation: Discharge Planning   The Plan for Transition of Care is related to the following treatment goals: Rehab - either IPR and SNF   The Patient and/or Patient Representative was provided with a Choice of Provider? Patient   The Patient and/Or Patient Representative agree with the Discharge Plan? Yes   Freedom of Choice list was provided with basic dialogue that supports the patient's individualized plan of care/goals, treatment preferences, and shares the quality data associated with the providers?  Yes

## 2024-12-12 PROCEDURE — 1100000000 HC RM PRIVATE

## 2024-12-12 PROCEDURE — 6370000000 HC RX 637 (ALT 250 FOR IP): Performed by: PHYSICIAN ASSISTANT

## 2024-12-12 PROCEDURE — 97535 SELF CARE MNGMENT TRAINING: CPT

## 2024-12-12 PROCEDURE — 6370000000 HC RX 637 (ALT 250 FOR IP): Performed by: NURSE PRACTITIONER

## 2024-12-12 PROCEDURE — 2580000003 HC RX 258: Performed by: PHYSICIAN ASSISTANT

## 2024-12-12 PROCEDURE — 97116 GAIT TRAINING THERAPY: CPT

## 2024-12-12 PROCEDURE — APPNB60 APP NON BILLABLE TIME 46-60 MINS: Performed by: NURSE PRACTITIONER

## 2024-12-12 PROCEDURE — 97530 THERAPEUTIC ACTIVITIES: CPT

## 2024-12-12 PROCEDURE — 97110 THERAPEUTIC EXERCISES: CPT

## 2024-12-12 RX ORDER — OXYCODONE AND ACETAMINOPHEN 10; 325 MG/1; MG/1
1 TABLET ORAL EVERY 6 HOURS PRN
Qty: 28 TABLET | Refills: 0 | Status: SHIPPED | OUTPATIENT
Start: 2024-12-12 | End: 2024-12-19

## 2024-12-12 RX ORDER — TRAZODONE HYDROCHLORIDE 50 MG/1
50 TABLET, FILM COATED ORAL NIGHTLY PRN
Status: DISCONTINUED | OUTPATIENT
Start: 2024-12-12 | End: 2024-12-16 | Stop reason: HOSPADM

## 2024-12-12 RX ADMIN — APIXABAN 2.5 MG: 5 TABLET, FILM COATED ORAL at 20:17

## 2024-12-12 RX ADMIN — FLUOXETINE HYDROCHLORIDE 20 MG: 20 CAPSULE ORAL at 09:30

## 2024-12-12 RX ADMIN — FLUOXETINE HYDROCHLORIDE 20 MG: 20 CAPSULE ORAL at 13:17

## 2024-12-12 RX ADMIN — OXYCODONE AND ACETAMINOPHEN 1 TABLET: 10; 325 TABLET ORAL at 09:31

## 2024-12-12 RX ADMIN — OXYCODONE AND ACETAMINOPHEN 1 TABLET: 10; 325 TABLET ORAL at 22:26

## 2024-12-12 RX ADMIN — BISACODYL 5 MG: 5 TABLET, COATED ORAL at 09:31

## 2024-12-12 RX ADMIN — GABAPENTIN 600 MG: 600 TABLET, FILM COATED ORAL at 13:17

## 2024-12-12 RX ADMIN — APIXABAN 2.5 MG: 5 TABLET, FILM COATED ORAL at 09:31

## 2024-12-12 RX ADMIN — OXYCODONE AND ACETAMINOPHEN 1 TABLET: 10; 325 TABLET ORAL at 18:25

## 2024-12-12 RX ADMIN — TOPIRAMATE 50 MG: 25 TABLET, FILM COATED ORAL at 20:16

## 2024-12-12 RX ADMIN — FUROSEMIDE 40 MG: 40 TABLET ORAL at 09:37

## 2024-12-12 RX ADMIN — OXYCODONE AND ACETAMINOPHEN 1 TABLET: 10; 325 TABLET ORAL at 06:14

## 2024-12-12 RX ADMIN — SENNOSIDES AND DOCUSATE SODIUM 1 TABLET: 50; 8.6 TABLET ORAL at 20:15

## 2024-12-12 RX ADMIN — OXYCODONE AND ACETAMINOPHEN 1 TABLET: 10; 325 TABLET ORAL at 01:44

## 2024-12-12 RX ADMIN — LEVOTHYROXINE SODIUM 125 MCG: 0.12 TABLET ORAL at 06:14

## 2024-12-12 RX ADMIN — BUSPIRONE HYDROCHLORIDE 5 MG: 5 TABLET ORAL at 09:31

## 2024-12-12 RX ADMIN — OXYCODONE AND ACETAMINOPHEN 1 TABLET: 10; 325 TABLET ORAL at 13:17

## 2024-12-12 RX ADMIN — GABAPENTIN 600 MG: 600 TABLET, FILM COATED ORAL at 09:31

## 2024-12-12 RX ADMIN — TOPIRAMATE 50 MG: 25 TABLET, FILM COATED ORAL at 09:31

## 2024-12-12 RX ADMIN — SODIUM CHLORIDE, PRESERVATIVE FREE 10 ML: 5 INJECTION INTRAVENOUS at 09:34

## 2024-12-12 RX ADMIN — FLUOXETINE HYDROCHLORIDE 20 MG: 20 CAPSULE ORAL at 20:15

## 2024-12-12 RX ADMIN — PANTOPRAZOLE SODIUM 40 MG: 40 TABLET, DELAYED RELEASE ORAL at 06:14

## 2024-12-12 RX ADMIN — GABAPENTIN 600 MG: 600 TABLET, FILM COATED ORAL at 20:10

## 2024-12-12 ASSESSMENT — PAIN SCALES - GENERAL
PAINLEVEL_OUTOF10: 6
PAINLEVEL_OUTOF10: 7
PAINLEVEL_OUTOF10: 10
PAINLEVEL_OUTOF10: 7
PAINLEVEL_OUTOF10: 5
PAINLEVEL_OUTOF10: 7

## 2024-12-12 ASSESSMENT — PAIN DESCRIPTION - ORIENTATION
ORIENTATION: LEFT

## 2024-12-12 ASSESSMENT — PAIN DESCRIPTION - DESCRIPTORS
DESCRIPTORS: ACHING
DESCRIPTORS: SHARP
DESCRIPTORS: THROBBING
DESCRIPTORS: ACHING;DISCOMFORT

## 2024-12-12 ASSESSMENT — PAIN DESCRIPTION - LOCATION
LOCATION: HIP;LEG
LOCATION: KNEE
LOCATION: KNEE
LOCATION: LEG

## 2024-12-12 NOTE — CARE COORDINATION
DICKSON:    Patient lives alone and would like to go to rehab. Pt does not qualify for IPR. CM discussed choice with Pt and sent referrals to [facility]:   [facility] - pending  [facility] - accepted    CM also provided SNF list for Pt to review in case those listed cannot accept. AUTH will be needed.     Pt does not own walker nor does she have a history of HH or rehab; her sister and son are her support system. CM will follow for therapy reccs.     Transition of Care Plan:    RUR: 7%  Prior Level of Functioning: home indep  Disposition: snf  LEAH: 12/13  If SNF or IPR: Date FOC offered: 12/11  Date FOC received: 12/11  Accepting facility:   Date authorization started with reference number:   Date authorization received and expires:   Follow up appointments:   DME needed: none if snf  Transportation at discharge:   /[place] Medicare/ letter given: y  Is patient a  and connected with VA?    If yes, was  transfer form completed and VA notified?   Caregiver Contact: [phone] Nora Bud sister  Discharge Caregiver contacted prior to discharge?   Care Conference needed?   Barriers to discharge:  placement, auth

## 2024-12-12 NOTE — CARE COORDINATION
DICKSON:    Patient lives alone and would like to go to rehab. CM met with Pt about accepting SNF options. She is agreeable to any place somewhat near her family and that is clean.   - [facility] (top choice per conversation) - accepted, will have private room 12/13  - [facility] - accepted, semi private room  - [facility]  [place], back up choice added today  (Pt also expressed interest in [facility] but WC is typically not in network Human)    AUTH for [place] started with [employer]:  [employer] phone:   To [employer]: FAX: [phone]   Suzie Farrell  1960  [id number]  Reference #: [id number] for SNF AUTH    Pt does not own walker nor does she have a history of HH or rehab; her sister and son are her support system. CM will follow for therapy reccs.     Transition of Care Plan:    RUR: 7%  Prior Level of Functioning: home indep  Disposition: snf  LEAH: 12/13  If SNF or IPR: Date FOC offered: 12/11  Date FOC received: 12/11  Accepting facility:   Date authorization started with reference number:   Date authorization received and expires:   Follow up appointments:   DME needed: none if snf  Transportation at discharge:   IM/IMM Medicare/ letter given: y  Is patient a [place] and connected with VA?    If yes, was  transfer form completed and VA notified?   Caregiver Contact: [phone] Nora Farrell sister  Discharge Caregiver contacted prior to discharge?   Care Conference needed?   Barriers to discharge:  snf auth

## 2024-12-13 PROCEDURE — 1100000000 HC RM PRIVATE

## 2024-12-13 PROCEDURE — 97116 GAIT TRAINING THERAPY: CPT

## 2024-12-13 PROCEDURE — 6370000000 HC RX 637 (ALT 250 FOR IP): Performed by: PHYSICIAN ASSISTANT

## 2024-12-13 PROCEDURE — 6370000000 HC RX 637 (ALT 250 FOR IP): Performed by: NURSE PRACTITIONER

## 2024-12-13 PROCEDURE — APPNB60 APP NON BILLABLE TIME 46-60 MINS: Performed by: NURSE PRACTITIONER

## 2024-12-13 PROCEDURE — 97530 THERAPEUTIC ACTIVITIES: CPT

## 2024-12-13 RX ADMIN — OXYCODONE AND ACETAMINOPHEN 1 TABLET: 10; 325 TABLET ORAL at 07:20

## 2024-12-13 RX ADMIN — LEVOTHYROXINE SODIUM 125 MCG: 0.12 TABLET ORAL at 07:19

## 2024-12-13 RX ADMIN — SENNOSIDES AND DOCUSATE SODIUM 1 TABLET: 50; 8.6 TABLET ORAL at 22:17

## 2024-12-13 RX ADMIN — SENNOSIDES AND DOCUSATE SODIUM 1 TABLET: 50; 8.6 TABLET ORAL at 09:26

## 2024-12-13 RX ADMIN — FLUOXETINE HYDROCHLORIDE 20 MG: 20 CAPSULE ORAL at 14:50

## 2024-12-13 RX ADMIN — OXYCODONE AND ACETAMINOPHEN 1 TABLET: 10; 325 TABLET ORAL at 15:00

## 2024-12-13 RX ADMIN — BISACODYL 5 MG: 5 TABLET, COATED ORAL at 09:19

## 2024-12-13 RX ADMIN — BUSPIRONE HYDROCHLORIDE 5 MG: 5 TABLET ORAL at 09:19

## 2024-12-13 RX ADMIN — TOPIRAMATE 50 MG: 25 TABLET, FILM COATED ORAL at 22:18

## 2024-12-13 RX ADMIN — OXYCODONE AND ACETAMINOPHEN 1 TABLET: 10; 325 TABLET ORAL at 02:14

## 2024-12-13 RX ADMIN — OXYCODONE AND ACETAMINOPHEN 1 TABLET: 10; 325 TABLET ORAL at 19:16

## 2024-12-13 RX ADMIN — PANTOPRAZOLE SODIUM 40 MG: 40 TABLET, DELAYED RELEASE ORAL at 07:20

## 2024-12-13 RX ADMIN — OXYCODONE AND ACETAMINOPHEN 1 TABLET: 10; 325 TABLET ORAL at 23:42

## 2024-12-13 RX ADMIN — GABAPENTIN 600 MG: 600 TABLET, FILM COATED ORAL at 09:19

## 2024-12-13 RX ADMIN — FLUOXETINE HYDROCHLORIDE 20 MG: 20 CAPSULE ORAL at 22:18

## 2024-12-13 RX ADMIN — TOPIRAMATE 50 MG: 25 TABLET, FILM COATED ORAL at 09:19

## 2024-12-13 RX ADMIN — APIXABAN 2.5 MG: 5 TABLET, FILM COATED ORAL at 09:19

## 2024-12-13 RX ADMIN — POLYETHYLENE GLYCOL 3350 17 G: 17 POWDER, FOR SOLUTION ORAL at 09:26

## 2024-12-13 RX ADMIN — APIXABAN 2.5 MG: 5 TABLET, FILM COATED ORAL at 22:17

## 2024-12-13 RX ADMIN — FLUOXETINE HYDROCHLORIDE 20 MG: 20 CAPSULE ORAL at 09:19

## 2024-12-13 RX ADMIN — GABAPENTIN 600 MG: 600 TABLET, FILM COATED ORAL at 14:50

## 2024-12-13 RX ADMIN — GABAPENTIN 600 MG: 600 TABLET, FILM COATED ORAL at 22:17

## 2024-12-13 RX ADMIN — OXYCODONE AND ACETAMINOPHEN 1 TABLET: 10; 325 TABLET ORAL at 11:18

## 2024-12-13 ASSESSMENT — PAIN DESCRIPTION - DESCRIPTORS
DESCRIPTORS: ACHING
DESCRIPTORS: ACHING;DISCOMFORT
DESCRIPTORS: ACHING
DESCRIPTORS: ACHING;SORE
DESCRIPTORS: ACHING
DESCRIPTORS: ACHING;SORE

## 2024-12-13 ASSESSMENT — PAIN SCALES - GENERAL
PAINLEVEL_OUTOF10: 6
PAINLEVEL_OUTOF10: 7
PAINLEVEL_OUTOF10: 9
PAINLEVEL_OUTOF10: 0
PAINLEVEL_OUTOF10: 8

## 2024-12-13 ASSESSMENT — PAIN DESCRIPTION - LOCATION
LOCATION: LEG
LOCATION: KNEE
LOCATION: KNEE
LOCATION: LEG
LOCATION: LEG
LOCATION: KNEE

## 2024-12-13 ASSESSMENT — PAIN DESCRIPTION - ORIENTATION
ORIENTATION: LEFT

## 2024-12-13 ASSESSMENT — PAIN - FUNCTIONAL ASSESSMENT: PAIN_FUNCTIONAL_ASSESSMENT: ACTIVITIES ARE NOT PREVENTED

## 2024-12-13 ASSESSMENT — PAIN DESCRIPTION - PAIN TYPE: TYPE: ACUTE PAIN

## 2024-12-13 NOTE — CARE COORDINATION
12/13/24 1640   Condition of Participation: Discharge Planning   The Plan for Transition of Care is related to the following treatment goals: HH   The Patient and/or Patient Representative was provided with a Choice of Provider? Patient   The Patient and/Or Patient Representative agree with the Discharge Plan? Yes   Freedom of Choice list was provided with basic dialogue that supports the patient's individualized plan of care/goals, treatment preferences, and shares the quality data associated with the providers?  Yes

## 2024-12-13 NOTE — CARE COORDINATION
DICKSON:    CM called [employer] for an update on SNF AUTH 9:53am, still pending    CM met with Pt to discuss discharge transportation. Pt states she will have family/friend transport.     - [facility] (top choice per conversation) - accepted, will have private room 12/13 - this is patient CHOICE  - [facility] - accepted, semi private room  - [facility], back up choice added today  (Pt also expressed interest in [facility] but WC is typically not in network Human)    AUTH for [place] started with [employer]:  [employer] phone:   To [employer]: FAX: [phone]   Suzie Farrell  1960  [id number]  Reference #: [id number] for SNF AUTH    Pt does not own walker nor does she have a history of HH or rehab; her sister and son are her support system. CM will follow for therapy reccs.     Transition of Care Plan:    RUR: 7%  Prior Level of Functioning: home indep  Disposition: snf  LEAH: 12/13  If SNF or IPR: Date FOC offered: 12/11  Date FOC received: 12/11  Accepting facility: [facility]  Date authorization started with reference number: 12/12,  #: [id number]   Date authorization received and expires:   Follow up appointments: snf  DME needed: none if snf  Transportation at discharge:   IM/IMM Medicare/ letter given: y  Is patient a [place] and connected with VA?    If yes, was  transfer form completed and VA notified?   Caregiver Contact: [phone] Nora Farrell sister  Discharge Caregiver contacted prior to discharge?   Care Conference needed?   Barriers to discharge:  snf auth

## 2024-12-13 NOTE — CARE COORDINATION
Transition of Care Plan: anticipate d/c to the [facility] SNF pending [employer] insurance auth with Reference #: [id number] ,started 12/12;    VS    Home with HH PT/RN, referral sent to [facility],  Christopher RW ordered via light/[id number] portal, and confirmed delivery to pt's room  [employer] brochure provided to pt for OP Senior community resources    May need d/c transport assistance, likely w/c vs Family    RUR: 7%  Prior Level of Functioning: home indep  Disposition: snf vs hh  LEAH: 12/13  If SNF or IPR: Date FOC offered: 12/11  Date FOC received: 12/11  Accepting facility: [facility]  Date authorization started with reference number: 12/12/24  Date authorization received and expires:   Follow up appointments: as directed on AVS  DME needed: none if snf and Christopher RW  Transportation at discharge:   IM/IMM Medicare/ letter given: y  Is patient a [place] and connected with VA?               If yes, was [place] transfer form completed and VA notified?   Caregiver Contact: [phone] Nora Farrell sister  Discharge Caregiver contacted prior to discharge?   Care Conference needed?   Barriers to discharge:  snf auth  1615-CM was informed by [facility]/Representative that pt is anticipated to be offered a presumptive P2P. Hence, per CM met with pt at bedside to discuss HH and she is agreeable to [facility].  to follow.  Mery Guzman RN BSN [facility]

## 2024-12-14 PROCEDURE — 1100000000 HC RM PRIVATE

## 2024-12-14 PROCEDURE — 6370000000 HC RX 637 (ALT 250 FOR IP): Performed by: NURSE PRACTITIONER

## 2024-12-14 PROCEDURE — 97116 GAIT TRAINING THERAPY: CPT

## 2024-12-14 PROCEDURE — 6370000000 HC RX 637 (ALT 250 FOR IP): Performed by: PHYSICIAN ASSISTANT

## 2024-12-14 PROCEDURE — 97530 THERAPEUTIC ACTIVITIES: CPT

## 2024-12-14 PROCEDURE — 2580000003 HC RX 258: Performed by: PHYSICIAN ASSISTANT

## 2024-12-14 RX ADMIN — SENNOSIDES AND DOCUSATE SODIUM 1 TABLET: 50; 8.6 TABLET ORAL at 20:43

## 2024-12-14 RX ADMIN — OXYCODONE AND ACETAMINOPHEN 1 TABLET: 10; 325 TABLET ORAL at 13:26

## 2024-12-14 RX ADMIN — FLUOXETINE HYDROCHLORIDE 20 MG: 20 CAPSULE ORAL at 08:45

## 2024-12-14 RX ADMIN — BUSPIRONE HYDROCHLORIDE 5 MG: 5 TABLET ORAL at 08:44

## 2024-12-14 RX ADMIN — GABAPENTIN 600 MG: 600 TABLET, FILM COATED ORAL at 13:26

## 2024-12-14 RX ADMIN — LEVOTHYROXINE SODIUM 125 MCG: 0.12 TABLET ORAL at 06:14

## 2024-12-14 RX ADMIN — OXYCODONE AND ACETAMINOPHEN 1 TABLET: 10; 325 TABLET ORAL at 21:17

## 2024-12-14 RX ADMIN — OXYCODONE AND ACETAMINOPHEN 1 TABLET: 10; 325 TABLET ORAL at 08:49

## 2024-12-14 RX ADMIN — TOPIRAMATE 50 MG: 25 TABLET, FILM COATED ORAL at 20:43

## 2024-12-14 RX ADMIN — POLYETHYLENE GLYCOL 3350 17 G: 17 POWDER, FOR SOLUTION ORAL at 08:45

## 2024-12-14 RX ADMIN — GABAPENTIN 600 MG: 600 TABLET, FILM COATED ORAL at 20:43

## 2024-12-14 RX ADMIN — APIXABAN 2.5 MG: 5 TABLET, FILM COATED ORAL at 08:45

## 2024-12-14 RX ADMIN — APIXABAN 2.5 MG: 5 TABLET, FILM COATED ORAL at 20:43

## 2024-12-14 RX ADMIN — TOPIRAMATE 50 MG: 25 TABLET, FILM COATED ORAL at 08:45

## 2024-12-14 RX ADMIN — SODIUM CHLORIDE, PRESERVATIVE FREE 10 ML: 5 INJECTION INTRAVENOUS at 20:45

## 2024-12-14 RX ADMIN — FLUOXETINE HYDROCHLORIDE 20 MG: 20 CAPSULE ORAL at 13:26

## 2024-12-14 RX ADMIN — OXYCODONE AND ACETAMINOPHEN 1 TABLET: 10; 325 TABLET ORAL at 03:44

## 2024-12-14 RX ADMIN — PANTOPRAZOLE SODIUM 40 MG: 40 TABLET, DELAYED RELEASE ORAL at 06:14

## 2024-12-14 RX ADMIN — OXYCODONE AND ACETAMINOPHEN 1 TABLET: 10; 325 TABLET ORAL at 17:12

## 2024-12-14 RX ADMIN — BISACODYL 5 MG: 5 TABLET, COATED ORAL at 08:45

## 2024-12-14 RX ADMIN — GABAPENTIN 600 MG: 600 TABLET, FILM COATED ORAL at 08:45

## 2024-12-14 RX ADMIN — FLUOXETINE HYDROCHLORIDE 20 MG: 20 CAPSULE ORAL at 20:43

## 2024-12-14 RX ADMIN — SENNOSIDES AND DOCUSATE SODIUM 1 TABLET: 50; 8.6 TABLET ORAL at 08:45

## 2024-12-14 RX ADMIN — TIZANIDINE 4 MG: 4 TABLET ORAL at 03:15

## 2024-12-14 ASSESSMENT — PAIN SCALES - GENERAL
PAINLEVEL_OUTOF10: 3
PAINLEVEL_OUTOF10: 9
PAINLEVEL_OUTOF10: 0
PAINLEVEL_OUTOF10: 9
PAINLEVEL_OUTOF10: 6
PAINLEVEL_OUTOF10: 0
PAINLEVEL_OUTOF10: 7
PAINLEVEL_OUTOF10: 0

## 2024-12-14 ASSESSMENT — PAIN DESCRIPTION - DESCRIPTORS
DESCRIPTORS: ACHING;SHOOTING
DESCRIPTORS: SHOOTING
DESCRIPTORS: SORE
DESCRIPTORS: ACHING
DESCRIPTORS: ACHING

## 2024-12-14 ASSESSMENT — PAIN DESCRIPTION - ORIENTATION
ORIENTATION: LEFT

## 2024-12-14 ASSESSMENT — PAIN SCALES - WONG BAKER: WONGBAKER_NUMERICALRESPONSE: NO HURT

## 2024-12-14 ASSESSMENT — PAIN DESCRIPTION - LOCATION
LOCATION: KNEE

## 2024-12-14 NOTE — CARE COORDINATION
Transition of Care Plan  RUR 6%    Patient is being disvhage but no HH has accepted.  Cm will follow up with HH agencies if physician discharges without HH today.  [facility] has not responded    Patient's nurse is aware of no HH acceptance-- She will inform physician    CM sent additional referrals to agencies.  Waiting for responses  [facility] , [facility]    CM will continue to follow     KARLA HYDE

## 2024-12-15 PROCEDURE — 6370000000 HC RX 637 (ALT 250 FOR IP): Performed by: PHYSICIAN ASSISTANT

## 2024-12-15 PROCEDURE — 6370000000 HC RX 637 (ALT 250 FOR IP): Performed by: NURSE PRACTITIONER

## 2024-12-15 PROCEDURE — 97530 THERAPEUTIC ACTIVITIES: CPT

## 2024-12-15 PROCEDURE — 2580000003 HC RX 258: Performed by: PHYSICIAN ASSISTANT

## 2024-12-15 PROCEDURE — 97116 GAIT TRAINING THERAPY: CPT

## 2024-12-15 PROCEDURE — 1100000000 HC RM PRIVATE

## 2024-12-15 RX ADMIN — OXYCODONE AND ACETAMINOPHEN 1 TABLET: 10; 325 TABLET ORAL at 21:18

## 2024-12-15 RX ADMIN — BUSPIRONE HYDROCHLORIDE 5 MG: 5 TABLET ORAL at 08:53

## 2024-12-15 RX ADMIN — FLUOXETINE HYDROCHLORIDE 20 MG: 20 CAPSULE ORAL at 21:22

## 2024-12-15 RX ADMIN — SENNOSIDES AND DOCUSATE SODIUM 1 TABLET: 50; 8.6 TABLET ORAL at 21:23

## 2024-12-15 RX ADMIN — OXYCODONE AND ACETAMINOPHEN 1 TABLET: 10; 325 TABLET ORAL at 01:13

## 2024-12-15 RX ADMIN — OXYCODONE AND ACETAMINOPHEN 1 TABLET: 10; 325 TABLET ORAL at 05:15

## 2024-12-15 RX ADMIN — APIXABAN 2.5 MG: 5 TABLET, FILM COATED ORAL at 08:54

## 2024-12-15 RX ADMIN — PANTOPRAZOLE SODIUM 40 MG: 40 TABLET, DELAYED RELEASE ORAL at 05:15

## 2024-12-15 RX ADMIN — OXYCODONE AND ACETAMINOPHEN 1 TABLET: 10; 325 TABLET ORAL at 12:59

## 2024-12-15 RX ADMIN — FLUOXETINE HYDROCHLORIDE 20 MG: 20 CAPSULE ORAL at 14:10

## 2024-12-15 RX ADMIN — BISACODYL 5 MG: 5 TABLET, COATED ORAL at 08:54

## 2024-12-15 RX ADMIN — OXYCODONE AND ACETAMINOPHEN 1 TABLET: 10; 325 TABLET ORAL at 21:22

## 2024-12-15 RX ADMIN — FLUOXETINE HYDROCHLORIDE 20 MG: 20 CAPSULE ORAL at 08:53

## 2024-12-15 RX ADMIN — OXYCODONE AND ACETAMINOPHEN 1 TABLET: 10; 325 TABLET ORAL at 08:56

## 2024-12-15 RX ADMIN — TOPIRAMATE 50 MG: 25 TABLET, FILM COATED ORAL at 08:53

## 2024-12-15 RX ADMIN — SODIUM CHLORIDE, PRESERVATIVE FREE 10 ML: 5 INJECTION INTRAVENOUS at 21:24

## 2024-12-15 RX ADMIN — TOPIRAMATE 50 MG: 25 TABLET, FILM COATED ORAL at 21:21

## 2024-12-15 RX ADMIN — GABAPENTIN 600 MG: 600 TABLET, FILM COATED ORAL at 21:22

## 2024-12-15 RX ADMIN — GABAPENTIN 600 MG: 600 TABLET, FILM COATED ORAL at 08:54

## 2024-12-15 RX ADMIN — APIXABAN 2.5 MG: 5 TABLET, FILM COATED ORAL at 21:22

## 2024-12-15 RX ADMIN — POLYETHYLENE GLYCOL 3350 17 G: 17 POWDER, FOR SOLUTION ORAL at 05:25

## 2024-12-15 RX ADMIN — GABAPENTIN 600 MG: 600 TABLET, FILM COATED ORAL at 14:10

## 2024-12-15 RX ADMIN — OXYCODONE AND ACETAMINOPHEN 1 TABLET: 10; 325 TABLET ORAL at 17:08

## 2024-12-15 RX ADMIN — LEVOTHYROXINE SODIUM 125 MCG: 0.12 TABLET ORAL at 05:15

## 2024-12-15 RX ADMIN — SENNOSIDES AND DOCUSATE SODIUM 1 TABLET: 50; 8.6 TABLET ORAL at 08:53

## 2024-12-15 ASSESSMENT — PAIN DESCRIPTION - ORIENTATION
ORIENTATION: LEFT

## 2024-12-15 ASSESSMENT — PAIN DESCRIPTION - DESCRIPTORS
DESCRIPTORS: ACHING;BURNING
DESCRIPTORS: ACHING

## 2024-12-15 ASSESSMENT — PAIN DESCRIPTION - LOCATION
LOCATION: KNEE

## 2024-12-15 ASSESSMENT — PAIN SCALES - GENERAL
PAINLEVEL_OUTOF10: 0
PAINLEVEL_OUTOF10: 7
PAINLEVEL_OUTOF10: 5
PAINLEVEL_OUTOF10: 0
PAINLEVEL_OUTOF10: 7
PAINLEVEL_OUTOF10: 5
PAINLEVEL_OUTOF10: 7
PAINLEVEL_OUTOF10: 9
PAINLEVEL_OUTOF10: 0
PAINLEVEL_OUTOF10: 7

## 2024-12-15 ASSESSMENT — PAIN SCALES - WONG BAKER
WONGBAKER_NUMERICALRESPONSE: NO HURT

## 2024-12-15 ASSESSMENT — PAIN DESCRIPTION - ONSET: ONSET: ON-GOING

## 2024-12-15 ASSESSMENT — PAIN - FUNCTIONAL ASSESSMENT: PAIN_FUNCTIONAL_ASSESSMENT: ACTIVITIES ARE NOT PREVENTED

## 2024-12-15 ASSESSMENT — PAIN DESCRIPTION - FREQUENCY: FREQUENCY: CONTINUOUS

## 2024-12-15 NOTE — CARE COORDINATION
Transition of Care Plan  RUR 6%    Left knee arthroplasty     Patient did not discharge yesterday.  No  had seen secured     Insurance denied SNF on 12/13/24    [facility]   [phone]   accepted patient this am-- Start of care can be by Tuesday 12/17/24    CM will notify the agency when patient is discharged.     CM met with patient in her room-- She informed CM that she does not feel safe discharging today as she cannot complete the 14 steps to the second floor where her bedroom and bath are located.. She said she completed 8 steps this am with therapy but will have no one with her at home.     Patient does not have a bed on the first floor--and cannot remain on the first level as there is no place to sleep    CM provided her a list bed rental businesses. (Hospital bed or roll away)   She said she call tomorrow am.and investigate as the businesses are closed today.     Patient stated she cannot discharge to a family members home  -- ie one of her son's for a couple of days..     Patient said she will likely have a ride home with a family member when discharged.      CM will follow and inform [facility] when patient is discharged.      Patient will require 2nd Medicare letter prior to discharge     KARLA HYDE

## 2024-12-15 NOTE — PROGRESS NOTES
Discharge was order placed yesterday afternoon but auth denied for SNF.  refereals sent out at 12/14/24.    Physical therapy notified nurse that patient was able to walk up 8 steps of stairs this morning. Patient also getting a lock box to help lessen the times to walk downstairs when home health arrives. But patient does not feel comfortable walking up and down own home stairs as they are \"a lot different\" from practice steps.    Case management set up patient with [facility]. Case management and patient notified this nurse patient does not have any bed at home and does not want to go home without any bed. Patient stated she will buy a bed once stores open tomorrow. Patient currently sleeps on an air mattress and was not thinking she would be home this early. Patient plan was hoping they would go to SNF and once they were able to move that they would be then released home.    Patient also stating about possible blood clot on L thigh and is requesting all screenings such as \"MRI\". On assessment thigh is not tender, painful, or severely warm or any swelling.    Notified provider about home situation as well as patient concerns.   
Ortho NP Note    POD# 1  s/p LEFT TOTAL KNEE ARTHROPLASTY     Pt resting in bed comfortably, eating breakfast.   Reports postop knee pain improving, relieved by oxycodone   PTA she reports taking Percocet-10mg q 6 hrs prn for chronic kellie knee pain and sciatica. She also takes zanaflex.   Postop plan reviewed - She is requesting rehab placement due to limited support and 2 story home with bedroom/full bath on 2nd floor. Discussed hh vs rehab, she would still like to d/c to rehab    VSS Afebrile.    Visit Vitals  /66   Pulse 78   Temp 97.3 °F (36.3 °C) (Oral)   Resp 17   SpO2 98%       Voiding status: voiding          Labs    Lab Results   Component Value Date/Time    HGB 9.8 12/11/2024 07:19 AM      Lab Results   Component Value Date/Time    INR 1.0 12/02/2024 12:01 PM      Lab Results   Component Value Date/Time     12/11/2024 07:19 AM    K 5.2 12/11/2024 07:19 AM     12/11/2024 07:19 AM    CO2 23 12/11/2024 07:19 AM    BUN 18 12/11/2024 07:19 AM     Recent Glucose Results:   Glucose   Date Value Ref Range Status   12/11/2024 123 (H) 65 - 100 mg/dL Final   12/02/2024 75 65 - 100 mg/dL Final           There is no height or weight on file to calculate BMI. : A BMI > 30 is classified as obesity and > 40 is classified as morbid obesity.       Ace wrap removed. Dressing dry/intact with small sanguinous breakthrough   Cryotherapy in place over incision  Calves soft and supple; No pain with passive stretch  Sensation and motor intact. +PF/DF/EHL intact   SCDs for mechanical DVT proph while in bed     PLAN:  1) PT BID, OT - WBAT  2) Eliquis 2.5 mg PO BID for DVT Prophylaxis (unable to take ASA due to gastric bypass). Encouraged early mobilization, bed exercises, and SCD use.  3) Pain control - scheduled tylenol, and prn  oxycodone.  4) Post op care - Continue bowel regimen, encouraged IS. Straight cath per protocol. Dressing to remain in place x 7 day unless integrity is lost.    5) Readniess for 
Ortho NP Note    POD# 2  s/p LEFT TOTAL KNEE ARTHROPLASTY     Pt resting in bed comfortably. No changes overnight.   Reports postop knee pain improving, relieved by Percocet   PTA she reports taking Percocet-10mg q 6 hrs prn for chronic kellie knee pain and sciatica. She also takes zanaflex.   Postop plan reviewed - She is requesting rehab placement due to limited support and 2 story home with bedroom/full bath on 2nd floor. Discussed hh vs rehab, she would still like to d/c to rehab    VSS Afebrile.    Visit Vitals  /77   Pulse 82   Temp 98.2 °F (36.8 °C)   Resp 15   SpO2 97%       Voiding status: voiding          Labs    Lab Results   Component Value Date/Time    HGB 9.8 12/11/2024 07:19 AM      Lab Results   Component Value Date/Time    INR 1.0 12/02/2024 12:01 PM      Lab Results   Component Value Date/Time     12/11/2024 07:19 AM    K 5.2 12/11/2024 07:19 AM     12/11/2024 07:19 AM    CO2 23 12/11/2024 07:19 AM    BUN 18 12/11/2024 07:19 AM     Recent Glucose Results:   Glucose   Date Value Ref Range Status   12/11/2024 123 (H) 65 - 100 mg/dL Final   12/02/2024 75 65 - 100 mg/dL Final           There is no height or weight on file to calculate BMI. : A BMI > 30 is classified as obesity and > 40 is classified as morbid obesity.       Dressing dry/intact with small sanguinous breakthrough (unchanged)  Cryotherapy in place over incision  Calves soft and supple; No pain with passive stretch  Sensation and motor intact. +PF/DF/EHL intact   SCDs for mechanical DVT proph while in bed     PLAN:  1) PT BID, OT - WBAT  2) Eliquis 2.5 mg PO BID for DVT Prophylaxis (unable to take ASA due to gastric bypass). Encouraged early mobilization, bed exercises, and SCD use.  3) Pain control - prn Percocet  4) Post op care - Continue bowel regimen, encouraged IS. Straight cath per protocol. Dressing to remain in place x 7 day unless integrity is lost.    5) Discharge planning - SNF placement, referrals pending. Will 
Ortho NP Note    POD# 3  s/p LEFT TOTAL KNEE ARTHROPLASTY     Pt resting in bed comfortably. No changes overnight.   Reports postop knee pain improving, relieved by Percocet   PTA she reports taking Percocet-10mg q 6 hrs prn for chronic kellie knee pain and sciatica. She also takes zanaflex.   Tolerating regular diet. No nausea.   Postop plan reviewed - She is requesting rehab placement due to limited support and 2 story home with bedroom/full bath on 2nd floor. Discussed hh vs rehab, she would still like to d/c to rehab    VSS Afebrile.    Visit Vitals  /74   Pulse (!) 101   Temp 99.1 °F (37.3 °C)   Resp 15   SpO2 97%       Voiding status: voiding          Labs    Lab Results   Component Value Date/Time    HGB 9.8 12/11/2024 07:19 AM      Lab Results   Component Value Date/Time    INR 1.0 12/02/2024 12:01 PM      Lab Results   Component Value Date/Time     12/11/2024 07:19 AM    K 5.2 12/11/2024 07:19 AM     12/11/2024 07:19 AM    CO2 23 12/11/2024 07:19 AM    BUN 18 12/11/2024 07:19 AM     Recent Glucose Results:   Glucose   Date Value Ref Range Status   12/11/2024 123 (H) 65 - 100 mg/dL Final   12/02/2024 75 65 - 100 mg/dL Final           There is no height or weight on file to calculate BMI. : A BMI > 30 is classified as obesity and > 40 is classified as morbid obesity.       Dressing dry/intact with moderate amount of breakthrough drainage - will change today.   Cryotherapy in place over incision  Calves soft and supple; No pain with passive stretch  Sensation and motor intact. +PF/DF/EHL intact   SCDs for mechanical DVT proph while in bed     PLAN:  1) PT BID, OT - WBAT  2) Eliquis 2.5 mg PO BID for DVT Prophylaxis (unable to take ASA due to gastric bypass). Encouraged early mobilization, bed exercises, and SCD use.  3) Pain control - prn Percocet  4) Post op care - Continue bowel regimen, encouraged IS. Straight cath per protocol. Dressing to remain in place x 7 day unless integrity is lost.  
Orthopaedics Daily Progress Note                            Date of Surgery:  [unfilled]      Patient: Suzie Farrell   YOB: 1960  Age: 64 y.o.      SUBJECTIVE:   2 Days Post-Op following LEFT TOTAL KNEE ARTHROPLASTY.      The patient's post operative pain is well controlled.  No CP/SOB.  No N/V. The patient's mobility will be evaluated today during PT sessions.    OBJECTIVE:     Vital Signs:    /77   Pulse 82   Temp 98.2 °F (36.8 °C)   Resp 15   SpO2 97%     Physical Exam:  General: A&Ox3. The patient is cooperative, and in no acute distress.    Respiratory: Respirations are unlabored.  Surgical site(s): dressing clean, dry  Musculoskeletal: Calves are soft, supple, and non-tender upon palpation.  Motor 5/5.  Neurological:  Neurovascularly intact with good dorsi and plantar flexion.    Pulses symmetrical.    Laboratory Values:             No results found for this or any previous visit (from the past 12 hour(s)).      PLAN:     S/P LEFT TOTAL KNEE ARTHROPLASTY -Continue WBAT.  -Mobilize and continue with PT/OT until discharged     Hemodynamics Hgb is 9.8.  Acute blood loss anemia as expected. Patient asymptomatic.  Continue to monitor.     Wound Monitor postop dressing; no postop dressing changes necessary.  Reinforce PRN.     Post Operative Pain Pain Control: stable, mild-to-moderate joint symptoms intermittently, reasonably well controlled by current meds.     DVT Prophylaxis Continue with SCD'S, Ankle Pump Exercises. Eliquis 2.5 mg BID     Discharge Disposition Discharge plan: Skilled nursing facility.       Signed By: Suzie Rivera PA-C  December 12, 2024 9:59 AM  
Orthopedic surgery note    POD# 4  s/p LEFT TOTAL KNEE ARTHROPLASTY     Patient seen and examined this morning.  She is resting comfortably.  Still awaiting authorization for rehabilitation.  Discussed plans to work with therapy today.  Chest pain, shortness of breath.    VSS Afebrile.    Visit Vitals  BP 96/64   Pulse 69   Temp 98.1 °F (36.7 °C) (Oral)   Resp 16   SpO2 100%     Voiding status: voiding      Labs    Lab Results   Component Value Date/Time    HGB 9.8 12/11/2024 07:19 AM      Lab Results   Component Value Date/Time    INR 1.0 12/02/2024 12:01 PM      Lab Results   Component Value Date/Time     12/11/2024 07:19 AM    K 5.2 12/11/2024 07:19 AM     12/11/2024 07:19 AM    CO2 23 12/11/2024 07:19 AM    BUN 18 12/11/2024 07:19 AM     Recent Glucose Results:   Glucose   Date Value Ref Range Status   12/11/2024 123 (H) 65 - 100 mg/dL Final   12/02/2024 75 65 - 100 mg/dL Final       Dressing dry/intact, small strikethrough  Cryotherapy in place over incision  Calves soft and supple; No pain with passive stretch  Sensation and motor intact. +PF/DF/EHL intact   SCDs for mechanical DVT proph while in bed     PLAN:  1) PT BID, OT - WBAT  2) Eliquis 2.5 mg PO BID for DVT Prophylaxis (unable to take ASA due to gastric bypass). Encouraged early mobilization, bed exercises, and SCD use.  3) Pain control - prn Percocet  4) Post op care - Continue bowel regimen, encouraged IS. Straight cath per protocol. Dressing to remain in place x 7 day unless integrity is lost.    5) Discharge planning - SNF placement, accepted by [facility] [place], [facility] auth pending. Medically stable for discharge.  Hopefully discharge later this afternoon.    Rayo Beard,     
Orthopedic surgery note    POD# 5  s/p LEFT TOTAL KNEE ARTHROPLASTY     Patient seen and examined this morning.  Patient still continues to have moderate amount of pain.  She is slow to progress with therapy.  Has not been able to do stairs yet.  No chest pain, shortness of breath.    VSS Afebrile.    Visit Vitals  /69   Pulse 83   Temp 98.6 °F (37 °C) (Oral)   Resp 16   SpO2 100%     Voiding status: voiding      Labs    Lab Results   Component Value Date/Time    HGB 9.8 12/11/2024 07:19 AM      Lab Results   Component Value Date/Time    INR 1.0 12/02/2024 12:01 PM      Lab Results   Component Value Date/Time     12/11/2024 07:19 AM    K 5.2 12/11/2024 07:19 AM     12/11/2024 07:19 AM    CO2 23 12/11/2024 07:19 AM    BUN 18 12/11/2024 07:19 AM     Recent Glucose Results:   Glucose   Date Value Ref Range Status   12/11/2024 123 (H) 65 - 100 mg/dL Final   12/02/2024 75 65 - 100 mg/dL Final       Dressing dry/intact, small strikethrough  Cryotherapy in place over incision  Calves soft and supple; No pain with passive stretch  Sensation and motor intact. +PF/DF/EHL intact   SCDs for mechanical DVT proph while in bed     PLAN:  1) PT BID, OT - WBAT  2) Eliquis 2.5 mg PO BID for DVT Prophylaxis (unable to take ASA due to gastric bypass). Encouraged early mobilization, bed exercises, and SCD use.  3) Pain control - prn Percocet  4) Post op care - Continue bowel regimen, encouraged IS. Straight cath per protocol. Dressing to remain in place x 7 day unless integrity is lost.    5) Discharge planning - SNF placement vs , patient states that she is unable to go home because she doesn't have help.   on board    Rayo Beard,     
Pt tolerated PT services well and continues to progress toward PT POC goals. Pt received in bedside chair and amenable to therapy services. Pt confirms Ry with SPC at baseline and resides alone in a bi level apt with level entry. Excellent effort and active engagement t/o despite observed ~ lethargy and reported pain ~ 5/10. Pt confirms much better than before per change in meds with resulting improved activity tolerance and carryover.  With increased time/effort, most tasks performed with cga/[place] including multiple functional sit <> stand transfers, unit ambulation ~ 110ft with RW. Standing rest break midpoint with good recovery. Pt was assisted with return to bedside chair and positioned for comfort. Bilateral SCD foot pumps donned per Pt preference, cold pack applied for pain/edema reduction and Pt resting comfortably with all needs in reach. Per baseline status and ongoing appreciable functional mobility deficits, recommend dc to a rehab setting prior to return home alone. PT Team and full note to follow.    The patient has a mobility limitation that significantly impairs their ability to participate in one or more mobility-related activities of daily living in the home. The patient is able to safely use the rolling walker. The functional mobility deficit can be sufficiently resolved by use of a rolling walker.   Per shorter stature and hip width, may benefit from receipt of a NIALL bariatric rolling walker.    Patient is cleared for discharge from PT standpoint:  YES []     NO [x]     Christa Espinoza, PT, DPT    
TRANSFER - OUT REPORT:    Verbal report given to CHAU Lafleur on Suzie Farrell  being transferred to  for routine post-op       Report consisted of patient's Situation, Background, Assessment and   Recommendations(SBAR).     Information from the following report(s) Adult Overview, Surgery Report, Intake/Output, and MAR was reviewed with the receiving nurse.           Lines:   Peripheral IV 12/10/24 Left Hand (Active)   Site Assessment Clean, dry & intact 12/10/24 1005   Line Status Flushed;Capped 12/10/24 1005   Line Care Cap changed;Connections checked and tightened 12/10/24 1005   Phlebitis Assessment No symptoms 12/10/24 1005   Infiltration Assessment 0 12/10/24 1005   Alcohol Cap Used Yes 12/10/24 1005   Dressing Status Clean, dry & intact 12/10/24 1005   Dressing Type Transparent 12/10/24 1005       Peripheral IV 12/10/24 Right Hand (Active)   Site Assessment Clean, dry & intact 12/10/24 1005   Line Status Flushed;Infusing 12/10/24 1005   Line Care Cap changed;Connections checked and tightened 12/10/24 1005   Phlebitis Assessment No symptoms 12/10/24 1005   Infiltration Assessment 0 12/10/24 1005   Alcohol Cap Used Yes 12/10/24 1005   Dressing Status Clean, dry & intact 12/10/24 1005   Dressing Type Transparent 12/10/24 1005        Opportunity for questions and clarification was provided.      Patient transported with:  Tech        
integrity is lost.    5) Readniess for discharge:     [x] Vital Signs stable    [] Hgb stable    [x] + Voiding    [x] Wound intact, drainage minimal    [x] Tolerating PO intake     [] Cleared by PT (OT if applicable)     [] Stair training completed (if applicable)    [] Independent / Contact Guard Assist (household distance)     [] Bed mobility     [] Car transfers     [] ADL’s    [] Adequate pain control on oral medication alone     Routine postop care.   Discharge planning pending progress with therapy.     KAMALJIT Sears - NP

## 2024-12-16 VITALS
HEART RATE: 89 BPM | TEMPERATURE: 98.6 F | RESPIRATION RATE: 18 BRPM | DIASTOLIC BLOOD PRESSURE: 82 MMHG | OXYGEN SATURATION: 98 % | SYSTOLIC BLOOD PRESSURE: 122 MMHG

## 2024-12-16 LAB
ANION GAP SERPL CALC-SCNC: 4 MMOL/L (ref 2–12)
BASOPHILS # BLD: 0.1 K/UL (ref 0–0.1)
BASOPHILS NFR BLD: 1 % (ref 0–1)
BUN SERPL-MCNC: 12 MG/DL (ref 6–20)
BUN/CREAT SERPL: 20 (ref 12–20)
CALCIUM SERPL-MCNC: 8.8 MG/DL (ref 8.5–10.1)
CHLORIDE SERPL-SCNC: 110 MMOL/L (ref 97–108)
CO2 SERPL-SCNC: 26 MMOL/L (ref 21–32)
CREAT SERPL-MCNC: 0.59 MG/DL (ref 0.55–1.02)
DIFFERENTIAL METHOD BLD: ABNORMAL
EOSINOPHIL # BLD: 0.2 K/UL (ref 0–0.4)
EOSINOPHIL NFR BLD: 3 % (ref 0–7)
ERYTHROCYTE [DISTWIDTH] IN BLOOD BY AUTOMATED COUNT: 14.1 % (ref 11.5–14.5)
GLUCOSE SERPL-MCNC: 93 MG/DL (ref 65–100)
HCT VFR BLD AUTO: 24.4 % (ref 35–47)
HGB BLD-MCNC: 7.7 G/DL (ref 11.5–16)
IMM GRANULOCYTES # BLD AUTO: 0.1 K/UL (ref 0–0.04)
IMM GRANULOCYTES NFR BLD AUTO: 1 % (ref 0–0.5)
LYMPHOCYTES # BLD: 1.7 K/UL (ref 0.8–3.5)
LYMPHOCYTES NFR BLD: 27 % (ref 12–49)
MCH RBC QN AUTO: 31.3 PG (ref 26–34)
MCHC RBC AUTO-ENTMCNC: 31.6 G/DL (ref 30–36.5)
MCV RBC AUTO: 99.2 FL (ref 80–99)
MONOCYTES # BLD: 0.7 K/UL (ref 0–1)
MONOCYTES NFR BLD: 11 % (ref 5–13)
NEUTS SEG # BLD: 3.5 K/UL (ref 1.8–8)
NEUTS SEG NFR BLD: 57 % (ref 32–75)
NRBC # BLD: 0 K/UL (ref 0–0.01)
NRBC BLD-RTO: 0 PER 100 WBC
PLATELET # BLD AUTO: 248 K/UL (ref 150–400)
PMV BLD AUTO: 11 FL (ref 8.9–12.9)
POTASSIUM SERPL-SCNC: 4.1 MMOL/L (ref 3.5–5.1)
RBC # BLD AUTO: 2.46 M/UL (ref 3.8–5.2)
SODIUM SERPL-SCNC: 140 MMOL/L (ref 136–145)
WBC # BLD AUTO: 6.1 K/UL (ref 3.6–11)

## 2024-12-16 PROCEDURE — 6370000000 HC RX 637 (ALT 250 FOR IP): Performed by: NURSE PRACTITIONER

## 2024-12-16 PROCEDURE — 6370000000 HC RX 637 (ALT 250 FOR IP): Performed by: PHYSICIAN ASSISTANT

## 2024-12-16 PROCEDURE — 97535 SELF CARE MNGMENT TRAINING: CPT

## 2024-12-16 PROCEDURE — 2580000003 HC RX 258: Performed by: PHYSICIAN ASSISTANT

## 2024-12-16 PROCEDURE — 97110 THERAPEUTIC EXERCISES: CPT

## 2024-12-16 PROCEDURE — 85025 COMPLETE CBC W/AUTO DIFF WBC: CPT

## 2024-12-16 PROCEDURE — 80048 BASIC METABOLIC PNL TOTAL CA: CPT

## 2024-12-16 PROCEDURE — APPNB60 APP NON BILLABLE TIME 46-60 MINS: Performed by: NURSE PRACTITIONER

## 2024-12-16 PROCEDURE — 97116 GAIT TRAINING THERAPY: CPT

## 2024-12-16 RX ORDER — OXYCODONE AND ACETAMINOPHEN 10; 325 MG/1; MG/1
1 TABLET ORAL EVERY 6 HOURS PRN
Qty: 42 TABLET | Refills: 0 | Status: SHIPPED | OUTPATIENT
Start: 2024-12-16 | End: 2025-01-15

## 2024-12-16 RX ORDER — SENNA AND DOCUSATE SODIUM 50; 8.6 MG/1; MG/1
1 TABLET, FILM COATED ORAL DAILY
Qty: 30 TABLET | Refills: 1 | Status: SHIPPED | OUTPATIENT
Start: 2024-12-16

## 2024-12-16 RX ADMIN — OXYCODONE AND ACETAMINOPHEN 1 TABLET: 10; 325 TABLET ORAL at 01:16

## 2024-12-16 RX ADMIN — FLUOXETINE HYDROCHLORIDE 20 MG: 20 CAPSULE ORAL at 08:48

## 2024-12-16 RX ADMIN — TOPIRAMATE 50 MG: 25 TABLET, FILM COATED ORAL at 08:48

## 2024-12-16 RX ADMIN — OXYCODONE AND ACETAMINOPHEN 1 TABLET: 10; 325 TABLET ORAL at 13:53

## 2024-12-16 RX ADMIN — PANTOPRAZOLE SODIUM 40 MG: 40 TABLET, DELAYED RELEASE ORAL at 05:23

## 2024-12-16 RX ADMIN — SENNOSIDES AND DOCUSATE SODIUM 1 TABLET: 50; 8.6 TABLET ORAL at 08:48

## 2024-12-16 RX ADMIN — APIXABAN 2.5 MG: 5 TABLET, FILM COATED ORAL at 08:48

## 2024-12-16 RX ADMIN — FLUOXETINE HYDROCHLORIDE 20 MG: 20 CAPSULE ORAL at 13:53

## 2024-12-16 RX ADMIN — GABAPENTIN 600 MG: 600 TABLET, FILM COATED ORAL at 13:53

## 2024-12-16 RX ADMIN — GABAPENTIN 600 MG: 600 TABLET, FILM COATED ORAL at 08:48

## 2024-12-16 RX ADMIN — FUROSEMIDE 40 MG: 40 TABLET ORAL at 08:57

## 2024-12-16 RX ADMIN — OXYCODONE AND ACETAMINOPHEN 1 TABLET: 10; 325 TABLET ORAL at 17:28

## 2024-12-16 RX ADMIN — TRAZODONE HYDROCHLORIDE 50 MG: 50 TABLET ORAL at 01:22

## 2024-12-16 RX ADMIN — OXYCODONE AND ACETAMINOPHEN 1 TABLET: 10; 325 TABLET ORAL at 05:22

## 2024-12-16 RX ADMIN — SODIUM CHLORIDE, PRESERVATIVE FREE 10 ML: 5 INJECTION INTRAVENOUS at 08:53

## 2024-12-16 RX ADMIN — POLYETHYLENE GLYCOL 3350 17 G: 17 POWDER, FOR SOLUTION ORAL at 08:48

## 2024-12-16 RX ADMIN — OXYCODONE AND ACETAMINOPHEN 1 TABLET: 10; 325 TABLET ORAL at 09:36

## 2024-12-16 RX ADMIN — BISACODYL 5 MG: 5 TABLET, COATED ORAL at 08:48

## 2024-12-16 RX ADMIN — BUSPIRONE HYDROCHLORIDE 5 MG: 5 TABLET ORAL at 08:48

## 2024-12-16 RX ADMIN — LEVOTHYROXINE SODIUM 125 MCG: 0.12 TABLET ORAL at 05:23

## 2024-12-16 ASSESSMENT — PAIN DESCRIPTION - PAIN TYPE
TYPE: ACUTE PAIN;SURGICAL PAIN

## 2024-12-16 ASSESSMENT — PAIN DESCRIPTION - DESCRIPTORS
DESCRIPTORS: ACHING

## 2024-12-16 ASSESSMENT — PAIN DESCRIPTION - ONSET
ONSET: ON-GOING

## 2024-12-16 ASSESSMENT — PAIN DESCRIPTION - ORIENTATION
ORIENTATION: LEFT

## 2024-12-16 ASSESSMENT — PAIN SCALES - GENERAL
PAINLEVEL_OUTOF10: 8
PAINLEVEL_OUTOF10: 7
PAINLEVEL_OUTOF10: 5
PAINLEVEL_OUTOF10: 10
PAINLEVEL_OUTOF10: 7
PAINLEVEL_OUTOF10: 8
PAINLEVEL_OUTOF10: 8
PAINLEVEL_OUTOF10: 6

## 2024-12-16 ASSESSMENT — PAIN DESCRIPTION - LOCATION
LOCATION: LEG
LOCATION: KNEE
LOCATION: LEG

## 2024-12-16 ASSESSMENT — PAIN DESCRIPTION - FREQUENCY
FREQUENCY: CONTINUOUS

## 2024-12-16 ASSESSMENT — PAIN - FUNCTIONAL ASSESSMENT
PAIN_FUNCTIONAL_ASSESSMENT: PREVENTS OR INTERFERES SOME ACTIVE ACTIVITIES AND ADLS
PAIN_FUNCTIONAL_ASSESSMENT: ACTIVITIES ARE NOT PREVENTED
PAIN_FUNCTIONAL_ASSESSMENT: PREVENTS OR INTERFERES SOME ACTIVE ACTIVITIES AND ADLS

## 2024-12-16 NOTE — CARE COORDINATION
DICKSON:    Discharge home today with [facility]  [phone]       A friend or family will transport this evening.     Pt has RW in her room; was ordered last week.     Peer to Peer offered by [employer] for SNF ([facility] pending) - patient DENIED for SNF auth by [employer]  Must call today  by Noon - was completed  Phone: [phone] Option 5  Provide Patient Name   [id number]  Policy # [id number]   CM notified Ortho NP as well as Ortho PA of [employer]'s request for P2P    Pt still wanted to d/c to SNF but HH has also been set up/is pending and patient is looking into rental hospital beds     CM notified that Pt needs BSC; CM placed order. BSC, if approved, will be delivered to Pt's home.     RUR: 9%  Prior Level of Functioning: home indep  Disposition: snf vs hh  LEAH:   If SNF or IPR: Date FOC offered:   Date FOC received:   Accepting facility: [facility]  Date authorization started with reference number: 24  Date authorization received and expires:   Follow up appointments: as directed on AVS  DME needed: none if snf and Christopher RW  Transportation at discharge: w/c van v family  IM/IMM Medicare/ letter given: y  Is patient a [place] and connected with [facility]?               If yes, was [place] transfer form completed and [facility] notified?   Caregiver Contact: [phone] Nora Farrell sister  Discharge Caregiver contacted prior to discharge?   Care Conference needed?   Barriers to discharge:  snf auth  1615-CM was informed by [facility]/Representative that pt is anticipated to be offered a presumptive P2P. Hence, per CM met with pt at bedside to discuss HH and she is agreeable to [facility]. CM to follow.  Mery Guzman RN BSN CCM

## 2024-12-16 NOTE — PLAN OF CARE
Problem: Pain  Goal: Verbalizes/displays adequate comfort level or baseline comfort level  12/10/2024 2011 by Bridgette Lang RN  Outcome: Progressing  12/10/2024 1150 by Ciarra Aguillon RN  Outcome: Progressing     Problem: Safety - Adult  Goal: Free from fall injury  12/10/2024 2011 by Bridgette Lang RN  Outcome: Progressing  12/10/2024 1150 by Ciarra Aguillon RN  Outcome: Progressing     Problem: Discharge Planning  Goal: Discharge to home or other facility with appropriate resources  12/10/2024 2011 by Bridgette Lang RN  Outcome: Progressing  12/10/2024 1150 by Ciarra Aguillon RN  Outcome: Progressing     Problem: Physical Therapy - Adult  Goal: By Discharge: Performs mobility at highest level of function for planned discharge setting.  See evaluation for individualized goals.  Description: FUNCTIONAL STATUS PRIOR TO ADMISSION: Patient was modified independent using a single point cane for functional mobility.    HOME SUPPORT PRIOR TO ADMISSION: The patient lived alone and did not require assistance.  Pt plans to discharge to SNF.      Physical Therapy Goals  Initiated 12/10/2024  1.  Patient will move from supine to sit and sit to supine and scoot up and down in bed with modified independence within 4 day(s).    2.  Patient will perform sit to stand with modified independence within 4 day(s).  3.  Patient will transfer from bed to chair and chair to bed with modified independence using the least restrictive device within 4 day(s).  4.  Patient will ambulate with modified independence for > 150 feet with the least restrictive device within 4 day(s).   5.  Patient will ascend/descend 4 stairs with one handrail(s) with supervision/set-up within 4 day(s).  6. Patient will perform TKR home exercise program per protocol with supervision/set-up within 4 days.  7. Patient will demonstrate AROM 0-90 degrees in operative joint within 4 days.     12/10/2024 1345 by Linh Zaidi, PT  Outcome: Progressing     
  Problem: Pain  Goal: Verbalizes/displays adequate comfort level or baseline comfort level  12/11/2024 0803 by Ciarra Aguillon RN  Outcome: Progressing  12/10/2024 2011 by Bridgette Lang, RN  Outcome: Progressing     Problem: Safety - Adult  Goal: Free from fall injury  12/11/2024 0803 by Ciarra Aguillon RN  Outcome: Progressing  12/10/2024 2011 by Bridgette Lang, RN  Outcome: Progressing     Problem: Discharge Planning  Goal: Discharge to home or other facility with appropriate resources  12/11/2024 0803 by Ciarra Aguillon RN  Outcome: Progressing  12/10/2024 2011 by Bridgette Lang, RN  Outcome: Progressing     
  Problem: Pain  Goal: Verbalizes/displays adequate comfort level or baseline comfort level  12/12/2024 1311 by Justin Hurtado RN  Outcome: Progressing  12/11/2024 2332 by Socorro Manzo LPN  Outcome: Progressing     Problem: Safety - Adult  Goal: Free from fall injury  12/12/2024 1311 by Justin Hurtado RN  Outcome: Progressing  12/11/2024 2332 by Socorro Manzo LPN  Outcome: Progressing     Problem: Discharge Planning  Goal: Discharge to home or other facility with appropriate resources  12/12/2024 1311 by Justin Hurtado RN  Outcome: Progressing  Flowsheets (Taken 12/12/2024 0800)  Discharge to home or other facility with appropriate resources: Identify barriers to discharge with patient and caregiver  12/11/2024 2332 by Socorro Manzo LPN  Outcome: Progressing     
  Problem: Pain  Goal: Verbalizes/displays adequate comfort level or baseline comfort level  12/12/2024 2038 by Daniel Kohler RN  Outcome: Progressing  12/12/2024 1311 by Justin Hurtado RN  Outcome: Progressing     Problem: Safety - Adult  Goal: Free from fall injury  12/12/2024 2038 by Daniel Kohler RN  Outcome: Progressing  12/12/2024 1311 by Justin Hurtado RN  Outcome: Progressing     Problem: Discharge Planning  Goal: Discharge to home or other facility with appropriate resources  12/12/2024 2038 by Daniel Kohler RN  Outcome: Progressing  12/12/2024 1311 by Justin Hurtado RN  Outcome: Progressing  Flowsheets (Taken 12/12/2024 0800)  Discharge to home or other facility with appropriate resources: Identify barriers to discharge with patient and caregiver     Problem: Physical Therapy - Adult  Goal: By Discharge: Performs mobility at highest level of function for planned discharge setting.  See evaluation for individualized goals.  Description: FUNCTIONAL STATUS PRIOR TO ADMISSION: Patient was modified independent using a single point cane for functional mobility.    HOME SUPPORT PRIOR TO ADMISSION: The patient lived alone and did not require assistance.  Pt plans to discharge to SNF.      Physical Therapy Goals  Initiated 12/10/2024  1.  Patient will move from supine to sit and sit to supine and scoot up and down in bed with modified independence within 4 day(s).    2.  Patient will perform sit to stand with modified independence within 4 day(s).  3.  Patient will transfer from bed to chair and chair to bed with modified independence using the least restrictive device within 4 day(s).  4.  Patient will ambulate with modified independence for > 150 feet with the least restrictive device within 4 day(s).   5.  Patient will ascend/descend 4 stairs with one handrail(s) with supervision/set-up within 4 day(s).  6. Patient will perform TKR home exercise program per protocol with supervision/set-up 
  Problem: Pain  Goal: Verbalizes/displays adequate comfort level or baseline comfort level  12/13/2024 1036 by Justin Hurtado RN  Outcome: Progressing  12/12/2024 2038 by Daniel Kohler, RN  Outcome: Progressing     Problem: Safety - Adult  Goal: Free from fall injury  12/13/2024 1036 by Justin Hurtado RN  Outcome: Progressing  12/12/2024 2038 by Daniel Kohler, RN  Outcome: Progressing     Problem: Discharge Planning  Goal: Discharge to home or other facility with appropriate resources  12/13/2024 1036 by Justin Hurtado RN  Outcome: Progressing  Flowsheets (Taken 12/13/2024 0800)  Discharge to home or other facility with appropriate resources: Identify barriers to discharge with patient and caregiver  12/12/2024 2038 by Daniel Kohler, RN  Outcome: Progressing     
  Problem: Pain  Goal: Verbalizes/displays adequate comfort level or baseline comfort level  12/15/2024 0915 by Radha Tran RN  Outcome: Progressing     Problem: Safety - Adult  Goal: Free from fall injury  12/15/2024 0915 by Radha Tran RN  Outcome: Progressing     Problem: Discharge Planning  Goal: Discharge to home or other facility with appropriate resources  12/15/2024 0915 by Radha Tran RN  Outcome: Progressing     Problem: Physical Therapy - Adult  Goal: By Discharge: Performs mobility at highest level of function for planned discharge setting.  See evaluation for individualized goals.  Description: FUNCTIONAL STATUS PRIOR TO ADMISSION: Patient was modified independent using a single point cane for functional mobility.    HOME SUPPORT PRIOR TO ADMISSION: The patient lived alone and did not require assistance.  Pt plans to discharge to SNF.      Physical Therapy Goals  Re-Assessment - POD# 5 - 12/15/2025 Goals upgraded  1.  Patient will move from supine to sit and sit to supine and scoot up and down in bed with modified independence within 7 day(s).    2.  Patient will transfer from bed to chair and chair to bed with modified independence using the least restrictive device within 7 day(s).  3.  Patient will ascend/descend 14 stairs with one handrail(s) with supervision/set-up within 7 day(s).  4.  Patient will perform TKR home exercise program per protocol with supervision/set-up within 7 days.  5.  Patient will demonstrate AROM 0-90 degrees in operative joint within 7 days.      Initiated 12/10/2024  1.  Patient will move from supine to sit and sit to supine and scoot up and down in bed with modified independence within 4 day(s).    2.  Patient will perform sit to stand with modified independence within 4 day(s).  GOAL MET 12/15  3.  Patient will transfer from bed to chair and chair to bed with modified independence using the least restrictive device within 4 day(s).  4.  Patient will 
  Problem: Pain  Goal: Verbalizes/displays adequate comfort level or baseline comfort level  Outcome: Progressing     Problem: Safety - Adult  Goal: Free from fall injury  Outcome: Progressing     Problem: Discharge Planning  Goal: Discharge to home or other facility with appropriate resources  Outcome: Progressing     
  Problem: Pain  Goal: Verbalizes/displays adequate comfort level or baseline comfort level  Outcome: Progressing     Problem: Safety - Adult  Goal: Free from fall injury  Outcome: Progressing     Problem: Discharge Planning  Goal: Discharge to home or other facility with appropriate resources  Outcome: Progressing     Problem: Physical Therapy - Adult  Goal: By Discharge: Performs mobility at highest level of function for planned discharge setting.  See evaluation for individualized goals.  Description: FUNCTIONAL STATUS PRIOR TO ADMISSION: Patient was modified independent using a single point cane for functional mobility.    HOME SUPPORT PRIOR TO ADMISSION: The patient lived alone and did not require assistance.  Pt plans to discharge to SNF.      Physical Therapy Goals  Initiated 12/10/2024  1.  Patient will move from supine to sit and sit to supine and scoot up and down in bed with modified independence within 4 day(s).    2.  Patient will perform sit to stand with modified independence within 4 day(s).  3.  Patient will transfer from bed to chair and chair to bed with modified independence using the least restrictive device within 4 day(s).  4.  Patient will ambulate with modified independence for > 150 feet with the least restrictive device within 4 day(s).   5.  Patient will ascend/descend 4 stairs with one handrail(s) with supervision/set-up within 4 day(s).  6. Patient will perform TKR home exercise program per protocol with supervision/set-up within 4 days.  7. Patient will demonstrate AROM 0-90 degrees in operative joint within 4 days.     12/11/2024 1728 by Linh Zaidi, PT  Outcome: Progressing  12/11/2024 1708 by Linh Zaidi, PT  Outcome: Progressing     Problem: Occupational Therapy - Adult  Goal: By Discharge: Performs self-care activities at highest level of function for planned discharge setting.  See evaluation for individualized goals.  Description: FUNCTIONAL STATUS PRIOR TO ADMISSION:  Pt 
  Problem: Physical Therapy - Adult  Goal: By Discharge: Performs mobility at highest level of function for planned discharge setting.  See evaluation for individualized goals.  Description: FUNCTIONAL STATUS PRIOR TO ADMISSION: Patient was modified independent using a single point cane for functional mobility.    HOME SUPPORT PRIOR TO ADMISSION: The patient lived alone and did not require assistance.  Pt plans to discharge to SNF.      Physical Therapy Goals  Initiated 12/10/2024  1.  Patient will move from supine to sit and sit to supine and scoot up and down in bed with modified independence within 4 day(s).    2.  Patient will perform sit to stand with modified independence within 4 day(s).  3.  Patient will transfer from bed to chair and chair to bed with modified independence using the least restrictive device within 4 day(s).  4.  Patient will ambulate with modified independence for > 150 feet with the least restrictive device within 4 day(s).   5.  Patient will ascend/descend 4 stairs with one handrail(s) with supervision/set-up within 4 day(s).  6. Patient will perform TKR home exercise program per protocol with supervision/set-up within 4 days.  7. Patient will demonstrate AROM 0-90 degrees in operative joint within 4 days.     12/11/2024 1708 by Linh Zaidi, PT  Outcome: Progressing     PHYSICAL THERAPY TREATMENT    Patient: Suzie Farrell (64 y.o. female)  Date: 12/11/2024  Diagnosis: Bilateral primary osteoarthritis of knee [M17.0]  Arthritis of left knee [M17.12] Bilateral primary osteoarthritis of knee  Procedure(s) (LRB):  LEFT TOTAL KNEE ARTHROPLASTY (Left) 1 Day Post-Op  Precautions: Weight Bearing, Fall Risk   Left Lower Extremity Weight Bearing: Weight Bearing As Tolerated                  ASSESSMENT:  Patient continues to benefit from skilled PT services and is slowly progressing towards goals. Pt with improved gait this pm - with use of short adult RW - pt achieved upright standing > 50% of 
  Problem: Physical Therapy - Adult  Goal: By Discharge: Performs mobility at highest level of function for planned discharge setting.  See evaluation for individualized goals.  Description: FUNCTIONAL STATUS PRIOR TO ADMISSION: Patient was modified independent using a single point cane for functional mobility.    HOME SUPPORT PRIOR TO ADMISSION: The patient lived alone and did not require assistance.  Pt plans to discharge to SNF.      Physical Therapy Goals  Initiated 12/10/2024  1.  Patient will move from supine to sit and sit to supine and scoot up and down in bed with modified independence within 4 day(s).    2.  Patient will perform sit to stand with modified independence within 4 day(s).  3.  Patient will transfer from bed to chair and chair to bed with modified independence using the least restrictive device within 4 day(s).  4.  Patient will ambulate with modified independence for > 150 feet with the least restrictive device within 4 day(s).   5.  Patient will ascend/descend 4 stairs with one handrail(s) with supervision/set-up within 4 day(s).  6. Patient will perform TKR home exercise program per protocol with supervision/set-up within 4 days.  7. Patient will demonstrate AROM 0-90 degrees in operative joint within 4 days.     12/12/2024 1541 by Christa Espinoza, PT  Outcome: Progressing  12/12/2024 1533 by Christa Espinoza, PT  Outcome: Progressing     PHYSICAL THERAPY TREATMENT    Patient: Suzie Farrell (64 y.o. female)  Date: 12/12/2024  Diagnosis:   Bilateral primary osteoarthritis of knee [M17.0]  Arthritis of left knee [M17.12] Bilateral primary osteoarthritis of knee  Procedure(s) (LRB):  LEFT TOTAL KNEE ARTHROPLASTY (Left) 2 Days Post-Op  Precautions:  (falls, LLE WBAT)   Left Lower Extremity Weight Bearing: Weight Bearing As Tolerated                  ASSESSMENT:  Pt tolerated PT services well and continues to progress toward PT POC goals. Pt received in bed gently sleeping, but easy 
  Problem: Physical Therapy - Adult  Goal: By Discharge: Performs mobility at highest level of function for planned discharge setting.  See evaluation for individualized goals.  Description: FUNCTIONAL STATUS PRIOR TO ADMISSION: Patient was modified independent using a single point cane for functional mobility.    HOME SUPPORT PRIOR TO ADMISSION: The patient lived alone and did not require assistance.  Pt plans to discharge to SNF.      Physical Therapy Goals  Initiated 12/10/2024  1.  Patient will move from supine to sit and sit to supine and scoot up and down in bed with modified independence within 4 day(s).    2.  Patient will perform sit to stand with modified independence within 4 day(s).  3.  Patient will transfer from bed to chair and chair to bed with modified independence using the least restrictive device within 4 day(s).  4.  Patient will ambulate with modified independence for > 150 feet with the least restrictive device within 4 day(s).   5.  Patient will ascend/descend 4 stairs with one handrail(s) with supervision/set-up within 4 day(s).  6. Patient will perform TKR home exercise program per protocol with supervision/set-up within 4 days.  7. Patient will demonstrate AROM 0-90 degrees in operative joint within 4 days.     Outcome: Progressing     PHYSICAL THERAPY TREATMENT    Patient: Suzie Farrell (64 y.o. female)  Date: 12/12/2024  Diagnosis:   Bilateral primary osteoarthritis of knee [M17.0]  Arthritis of left knee [M17.12] Bilateral primary osteoarthritis of knee  Procedure(s) (LRB):  LEFT TOTAL KNEE ARTHROPLASTY (Left) 2 Days Post-Op  Precautions: Weight Bearing, Fall Risk   Left Lower Extremity Weight Bearing: Weight Bearing As Tolerated                  ASSESSMENT:    Pt tolerated PT services well and continues to progress toward PT POC goals. Pt received in bedside chair and amenable to therapy services. Pt confirms Ry with [facility] at baseline and resides alone in a bi level apt with level 
  Problem: Physical Therapy - Adult  Goal: By Discharge: Performs mobility at highest level of function for planned discharge setting.  See evaluation for individualized goals.  Description: FUNCTIONAL STATUS PRIOR TO ADMISSION: Patient was modified independent using a single point cane for functional mobility.    HOME SUPPORT PRIOR TO ADMISSION: The patient lived alone and did not require assistance.  Pt plans to discharge to SNF.      Physical Therapy Goals  Initiated 12/10/2024  1.  Patient will move from supine to sit and sit to supine and scoot up and down in bed with modified independence within 4 day(s).    2.  Patient will perform sit to stand with modified independence within 4 day(s).  3.  Patient will transfer from bed to chair and chair to bed with modified independence using the least restrictive device within 4 day(s).  4.  Patient will ambulate with modified independence for > 150 feet with the least restrictive device within 4 day(s).   5.  Patient will ascend/descend 4 stairs with one handrail(s) with supervision/set-up within 4 day(s).  6. Patient will perform TKR home exercise program per protocol with supervision/set-up within 4 days.  7. Patient will demonstrate AROM 0-90 degrees in operative joint within 4 days.     Outcome: Progressing     PHYSICAL THERAPY TREATMENT    Patient: Suzie Farrell (64 y.o. female)  Date: 12/14/2024  Diagnosis: Bilateral primary osteoarthritis of knee [M17.0]  Arthritis of left knee [M17.12] Bilateral primary osteoarthritis of knee  Procedure(s) (LRB):  LEFT TOTAL KNEE ARTHROPLASTY (Left) 4 Days Post-Op  Precautions:  (falls, LLE WBAT)   Left Lower Extremity Weight Bearing: Weight Bearing As Tolerated                  ASSESSMENT:  Patient continues to benefit from skilled PT services and is progressing towards goals. Received in bed with cold pack left knee.  Pt mobilized to EOB on her own using gait belt for LLE lift assist, completed transfers and ambulated 75 
  Problem: Physical Therapy - Adult  Goal: By Discharge: Performs mobility at highest level of function for planned discharge setting.  See evaluation for individualized goals.  Description: FUNCTIONAL STATUS PRIOR TO ADMISSION: Patient was modified independent using a single point cane for functional mobility.    HOME SUPPORT PRIOR TO ADMISSION: The patient lived alone and did not require assistance.  Pt plans to discharge to SNF.      Physical Therapy Goals  Initiated 12/10/2024  1.  Patient will move from supine to sit and sit to supine and scoot up and down in bed with modified independence within 4 day(s).    2.  Patient will perform sit to stand with modified independence within 4 day(s).  3.  Patient will transfer from bed to chair and chair to bed with modified independence using the least restrictive device within 4 day(s).  4.  Patient will ambulate with modified independence for > 150 feet with the least restrictive device within 4 day(s).   5.  Patient will ascend/descend 4 stairs with one handrail(s) with supervision/set-up within 4 day(s).  6. Patient will perform TKR home exercise program per protocol with supervision/set-up within 4 days.  7. Patient will demonstrate AROM 0-90 degrees in operative joint within 4 days.     Outcome: Progressing   PHYSICAL THERAPY TREATMENT    Patient: Suzie Farrell (64 y.o. female)  Date: 12/11/2024  Diagnosis: Bilateral primary osteoarthritis of knee [M17.0]  Arthritis of left knee [M17.12] Bilateral primary osteoarthritis of knee  Procedure(s) (LRB):  LEFT TOTAL KNEE ARTHROPLASTY (Left) 1 Day Post-Op  Precautions: Weight Bearing, Fall Risk   Left Lower Extremity Weight Bearing: Weight Bearing As Tolerated                  ASSESSMENT:  Patient continues to benefit from skilled PT services and is slowly progressing towards goals. Pt continues to rate left knee pain 10/10 at rest and with mobility.  Pt continues to require cues and assist to improve positioning when 
  Problem: Physical Therapy - Adult  Goal: By Discharge: Performs mobility at highest level of function for planned discharge setting.  See evaluation for individualized goals.  Description: FUNCTIONAL STATUS PRIOR TO ADMISSION: Patient was modified independent using a single point cane for functional mobility.    HOME SUPPORT PRIOR TO ADMISSION: The patient lived alone and did not require assistance.  Pt plans to discharge to SNF.      Physical Therapy Goals  Initiated 12/10/2024  1.  Patient will move from supine to sit and sit to supine and scoot up and down in bed with modified independence within 4 day(s).    2.  Patient will perform sit to stand with modified independence within 4 day(s).  3.  Patient will transfer from bed to chair and chair to bed with modified independence using the least restrictive device within 4 day(s).  4.  Patient will ambulate with modified independence for > 150 feet with the least restrictive device within 4 day(s).   5.  Patient will ascend/descend 4 stairs with one handrail(s) with supervision/set-up within 4 day(s).  6. Patient will perform TKR home exercise program per protocol with supervision/set-up within 4 days.  7. Patient will demonstrate AROM 0-90 degrees in operative joint within 4 days.     Outcome: Progressing   PHYSICAL THERAPY TREATMENT    Patient: Suzie Farrell (64 y.o. female)  Date: 12/13/2024  Diagnosis: Bilateral primary osteoarthritis of knee [M17.0]  Arthritis of left knee [M17.12] Bilateral primary osteoarthritis of knee  Procedure(s) (LRB):  LEFT TOTAL KNEE ARTHROPLASTY (Left) 3 Days Post-Op  Precautions:  (falls, LLE WBAT)   Left Lower Extremity Weight Bearing: Weight Bearing As Tolerated                  ASSESSMENT:  Patient continues to benefit from skilled PT services and is slowly progressing towards goals. Pt continue to report severe left knee pain - POD# 3.  Pt able to perform stairs with bilateral rails with CGA x 1 and cues for proper 
  Problem: Physical Therapy - Adult  Goal: By Discharge: Performs mobility at highest level of function for planned discharge setting.  See evaluation for individualized goals.  Description: FUNCTIONAL STATUS PRIOR TO ADMISSION: Patient was modified independent using a single point cane for functional mobility.    HOME SUPPORT PRIOR TO ADMISSION: The patient lived alone and did not require assistance.  Pt plans to discharge to SNF.      Physical Therapy Goals  Initiated 12/10/2024  1.  Patient will move from supine to sit and sit to supine and scoot up and down in bed with modified independence within 4 day(s).    2.  Patient will perform sit to stand with modified independence within 4 day(s).  3.  Patient will transfer from bed to chair and chair to bed with modified independence using the least restrictive device within 4 day(s).  4.  Patient will ambulate with modified independence for > 150 feet with the least restrictive device within 4 day(s).   5.  Patient will ascend/descend 4 stairs with one handrail(s) with supervision/set-up within 4 day(s).  6. Patient will perform TKR home exercise program per protocol with supervision/set-up within 4 days.  7. Patient will demonstrate AROM 0-90 degrees in operative joint within 4 days.     PHYSICAL THERAPY EVALUATION    Patient: Suzie Farrell (64 y.o. female)  Date: 12/10/2024  Primary Diagnosis: Bilateral primary osteoarthritis of knee [M17.0]  Arthritis of left knee [M17.12]  Procedure(s) (LRB):  LEFT TOTAL KNEE ARTHROPLASTY (Left) Day of Surgery   Precautions: Restrictions/Precautions: Weight Bearing, Fall Risk   Lower Extremity Weight Bearing Restrictions  Left Lower Extremity Weight Bearing: Weight Bearing As Tolerated                  ASSESSMENT :   DEFICITS/IMPAIRMENTS:   The patient presents POD # 0 left TKR with pain left knee, decreased AROM/strength and function left leg, decreased activity tolerance, decline in functional mobility and impaired standing 
Problem: Occupational Therapy - Adult  Goal: By Discharge: Performs self-care activities at highest level of function for planned discharge setting.  See evaluation for individualized goals.  Description: FUNCTIONAL STATUS PRIOR TO ADMISSION:  Pt lives alone and is Mod I using SPC. Pt endorses several recent falls.     , Prior Level of Assist for ADLs: Independent,  ,  ,  ,  ,  ,  ,  , Prior Level of Assist for Transfers: Independent, Active : Yes      HOME SUPPORT: Patient lived alone.   Occupational Therapy Goals  Initiated 12/11/2024    1. Patient will perform lower body dressing with Minimal Assist within 7 day(s).  2. Patient will perform upper body ADLS standing for 5 minutes without fatigue or LOB with Modified [place] within 7 days.  3. Patient will perform all aspects of toileting at Modified [place] within 7 days.  4. Patient will perform toilet transfers with Modified [place] using rolling walker within 7 days.  5. Patient will utilize energy conservation techniques during functional activities without cues within 7 day(s).    Outcome: Progressing   OCCUPATIONAL THERAPY EVALUATION    Patient: Suzie Farrell (64 y.o. female)  Date: 12/11/2024  Primary Diagnosis: Bilateral primary osteoarthritis of knee [M17.0]  Arthritis of left knee [M17.12]  Procedure(s) (LRB):  LEFT TOTAL KNEE ARTHROPLASTY (Left) 1 Day Post-Op     Precautions: Weight Bearing, Fall Risk   Left Lower Extremity Weight Bearing: Weight Bearing As Tolerated              ASSESSMENT :  Pt is POD #1 L TKA. The patient is limited by decreased functional mobility, independence in ADLs, high-level IADLs, strength, activity tolerance, balance, increased pain levels.    Based on the impairments listed above Pt is requiring CGA-MaxA for BADLs. Pt reporting 9/10 pain prior and with mobility but was not a barrier to participation or function. Pt tolerated standing at sink for approx 10 minutes while completing various grooming 
Problem: Occupational Therapy - Adult  Goal: By Discharge: Performs self-care activities at highest level of function for planned discharge setting.  See evaluation for individualized goals.  Description: FUNCTIONAL STATUS PRIOR TO ADMISSION:  Pt lives alone and is Mod I using SPC. Pt endorses several recent falls.     , Prior Level of Assist for ADLs: Independent,  ,  ,  ,  ,  ,  ,  , Prior Level of Assist for Transfers: Independent, Active : Yes    HOME SUPPORT: Patient lived alone.     Occupational Therapy Goals  Initiated 12/11/2024    1. Patient will perform lower body dressing with Minimal Assist within 7 day(s).  2. Patient will perform upper body ADLS standing for 5 minutes without fatigue or LOB with Modified [place] within 7 days.  3. Patient will perform all aspects of toileting at Modified [place] within 7 days.  4. Patient will perform toilet transfers with Modified [place] using rolling walker within 7 days.  5. Patient will utilize energy conservation techniques during functional activities without cues within 7 day(s).    Outcome: Progressing   OCCUPATIONAL THERAPY TREATMENT  Patient: Suzie Farrell (64 y.o. female)  Date: 12/12/2024  Primary Diagnosis: Bilateral primary osteoarthritis of knee [M17.0]  Arthritis of left knee [M17.12]  Procedure(s) (LRB):  LEFT TOTAL KNEE ARTHROPLASTY (Left) 2 Days Post-Op   Precautions:  (falls, LLE WBAT)   Left Lower Extremity Weight Bearing: Weight Bearing As Tolerated            Chart, occupational therapy assessment, plan of care, and goals were reviewed.    ASSESSMENT  Patient continues to benefit from skilled OT services and is progressing towards goals. Pt presented in bed agreeable to therapy. Pt progressing with upright tolerance, activity tolerance, and pain management. Pt able to transfer to bathroom using RW CGA and then stand at sink for approx 10 minutes while completing oral care and hygiene. Pt remains limited by impaired 
right  Speed/Briana: Slow  Step Length: Right shortened;Left shortened  Swing Pattern: Left asymmetrical  Stance: Left decreased  Gait Abnormalities: Decreased step clearance      Pain Rating:  Left knee 8/10     Pain Intervention(s):   patient medicated for pain prior to session, ice, rest, and repositioning    Activity Tolerance:   Improving slowly - increasing amb distance    After treatment:   Patient left in no apparent distress in bed, Call bell within reach, and Side rails x3      COMMUNICATION/EDUCATION:   The patient's plan of care was discussed with: registered nurse    Patient Education  Education Given To: Patient  Education Provided: Fall Prevention Strategies;Mobility Training  Education Method: Demonstration;Verbal  Barriers to Learning: None  Education Outcome: Demonstrated understanding;Verbalized understanding;Continued education needed      Linh Zaidi, PT  Minutes: 25   
                           Quad Sets   []                                        []                                        []                                        []                                           Hamstring Sets   []                                        []                                        []                                        []                                           Short Arc Quads   []                                        []                                        []                                        []                                           Knee Extension Stretch     []                                          []                                          []                                          []                                           Heel Slides   []                                        []                                        []                                        []                                           Long Arc Quads 1 5 [x]                                        []                                        []                                        []                                           Knee Flexion Stretch 1 5 [x]                                        []                                        []                                        []                                           Straight Leg Raises   []                                        []                                        []                                        []                                                                                                                                                                                                                                                                  Pain Rating:  10/10, L knee   Pain Intervention(s):   patient medicated for pain prior to session    Activity Tolerance:   Fair , gradually improving     After treatment: 
wound in water  -follow doctors instructions on wound care and bandage   -only touch incisions with clean hands  -don't scrub over incisions and use a clean wash cloth and towel each time with bathing until incisions are healed  - shower or sponge bathe if indicated  -Patient did indicate understanding as evidenced by verbal discussion & carryover.   -if any questions arise pt was educated to contact their surgeon's office    Dressing joint: Patient instructed and demonstrated understanding to don/doff left LE first/last with minimal cues. Patient instructed and demonstrated to don all clothing while sitting prior to standing, doff all clothing to knees while standing, then sit to doff clothing off from knees to feet to facilitate fall prevention, pain management, and energy conservation with Supervision, reviewed AE if needed, hip kit issued.     Home safety: Patient instructed on home modifications and safety (raise height of ADL objects, appropriate height of chair surfaces, recliner safety, change of floor surfaces, clear pathways) to increase independence and fall prevention.  Patient indicated understanding.    Standing: Patient instructed and demonstrated during ADLs to walk up to sink/counter top/surfaces, step into walker to increase safety of joint and fall prevention with Supervision. Patient educated about knee anatomy verbally and educated to avoid rotation of left LE.  Instructed to apply concept to ADLs within the home (no twisting of knee during reaching across body, square off while using objects, slide objects along surfaces).  Patient instructed to increase amount of time standing, observe standing position during ADLs in order to increase even weight bearing through bilateral LEs in order to increase independence with ADLs.  Goal to be reached 30 days post - op, per orthopedic surgeon or per PT.  Patient indicated understanding.     Pain Rating:  10/10 in L knee   Pain Intervention(s): 
Ju QUEVEDO, Tonie S, Darwin KTonya. Activity Measure for Post-Acute Care \"6-Clicks\" Basic Mobility Scores Predict Discharge Destination After Acute Care Hospitalization in Select Patient Groups: A Retrospective, Observational Study. Arch Rehabil Res Clin Transl. 2022 Jul 16;4(3):052687. doi: [email or web address]/[email or web address].2022.989138. PMID: 88671400; PMCID: [id number].  4. Valentina GLORIA, Jojo S, Linda W, Maritza AGOSTO. AM-PAC Short Forms Manual 4.0. Revised 2/2020.                                                                                                                                                                                                                              Pain Rating:  Left knee 5/10 at rest; left knee 8/10 following amb and stairs    Pain Intervention(s):   patient medicated for pain prior to session, ice, rest, and repositioning    Activity Tolerance:   Improving - up/down 8 steps with rail/cane    After treatment:   Patient left in no apparent distress sitting up in chair and Call bell within reach    COMMUNICATION/EDUCATION:   The patient's plan of care was discussed with: registered nurse and     Patient Education  Education Provided: Fall Prevention Strategies;Mobility Training  Education Method: Demonstration;Verbal  Barriers to Learning: None  Education Outcome: Verbalized understanding;Demonstrated understanding;Continued education needed    Thank you for this referral.  Linh Zaidi, PT  Minutes: 52

## 2024-12-17 NOTE — DISCHARGE SUMMARY
ASA)     Postoperative transfusions:    Number of units banked PRBCs =   none     Post Op complications: none    Hemoglobin at discharge:    Lab Results   Component Value Date/Time    HGB 7.7 (L) 12/16/2024 05:59 AM    INR 1.0 12/02/2024 12:01 PM       Dressing changed on 12/13 due to moderate breakthrough drainage - incision well approximated, clean, dry and intact, no active drainage. New mepilex applied.  No significant erythema or swelling. Wound appears to be healing without any evidence of infection.     Physical Therapy started following surgery and participated in bed mobility, transfers and ambulation.          Discharged to: Home with .    Condition on Discharge:   Stable    Discharge instructions:  - Anticoagulate with Eliquis   - Take pain medications as prescribed  - Resume pre hospital diet      - Discharge activity: activity as tolerated  - Ambulate with assistive device as needed.  - Weight bearing status - WBAT  - Wound Care Keep wound clean and dry.  See discharge instruction sheet.            -DISCHARGE MEDICATION LIST        Medication List        START taking these medications      * apixaban 2.5 MG Tabs tablet  Commonly known as: Eliquis  Take 1 tablet by mouth 2 times daily     * apixaban 2.5 MG Tabs tablet  Commonly known as: Eliquis  Take 1 tablet by mouth 2 times daily     * sennosides-docusate sodium 8.6-50 MG tablet  Commonly known as: SENOKOT-S  Take 1 tablet by mouth in the morning and at bedtime     * sennosides-docusate sodium 8.6-50 MG tablet  Commonly known as: SENOKOT-S  Take 1 tablet by mouth daily           * This list has 4 medication(s) that are the same as other medications prescribed for you. Read the directions carefully, and ask your doctor or other care provider to review them with you.                CHANGE how you take these medications      * oxyCODONE-acetaminophen  MG per tablet  Commonly known as: PERCOCET  Take 1 tablet by mouth every 6 hours as needed for

## 2024-12-27 ENCOUNTER — APPOINTMENT (OUTPATIENT)
Facility: HOSPITAL | Age: 64
End: 2024-12-27
Attending: EMERGENCY MEDICINE
Payer: MEDICARE

## 2024-12-27 ENCOUNTER — HOSPITAL ENCOUNTER (EMERGENCY)
Facility: HOSPITAL | Age: 64
Discharge: HOME | End: 2024-12-27
Attending: EMERGENCY MEDICINE
Payer: MEDICARE

## 2024-12-27 VITALS
OXYGEN SATURATION: 97 % | HEIGHT: 60 IN | BODY MASS INDEX: 39.86 KG/M2 | DIASTOLIC BLOOD PRESSURE: 76 MMHG | RESPIRATION RATE: 18 BRPM | HEART RATE: 87 BPM | TEMPERATURE: 98.3 F | SYSTOLIC BLOOD PRESSURE: 116 MMHG | WEIGHT: 203.04 LBS

## 2024-12-27 DIAGNOSIS — M79.605 LEFT LEG PAIN: Primary | ICD-10-CM

## 2024-12-27 LAB — ECHO BSA: 1.97 M2

## 2024-12-27 PROCEDURE — 93971 EXTREMITY STUDY: CPT

## 2024-12-27 PROCEDURE — 99284 EMERGENCY DEPT VISIT MOD MDM: CPT

## 2024-12-27 PROCEDURE — 6370000000 HC RX 637 (ALT 250 FOR IP): Performed by: EMERGENCY MEDICINE

## 2024-12-27 RX ORDER — OXYCODONE AND ACETAMINOPHEN 10; 325 MG/1; MG/1
1 TABLET ORAL
Status: COMPLETED | OUTPATIENT
Start: 2024-12-27 | End: 2024-12-27

## 2024-12-27 RX ADMIN — OXYCODONE HYDROCHLORIDE AND ACETAMINOPHEN 1 TABLET: 10; 325 TABLET ORAL at 16:04

## 2024-12-27 ASSESSMENT — PAIN DESCRIPTION - DESCRIPTORS
DESCRIPTORS: ACHING
DESCRIPTORS: ACHING

## 2024-12-27 ASSESSMENT — PAIN DESCRIPTION - ORIENTATION
ORIENTATION: LEFT;LOWER
ORIENTATION: RIGHT;LEFT;LOWER

## 2024-12-27 ASSESSMENT — PAIN - FUNCTIONAL ASSESSMENT: PAIN_FUNCTIONAL_ASSESSMENT: 0-10

## 2024-12-27 ASSESSMENT — PAIN DESCRIPTION - LOCATION
LOCATION: LEG
LOCATION: LEG

## 2024-12-27 ASSESSMENT — PAIN SCALES - GENERAL
PAINLEVEL_OUTOF10: 8
PAINLEVEL_OUTOF10: 8

## 2024-12-27 NOTE — ED PROVIDER NOTES
[facility] EMERGENCY DEP  EMERGENCY DEPARTMENT ENCOUNTER      Pt Name: Suzie Farrell  MRN: [id number]  Birthdate 1960  Date of evaluation: 12/27/2024  Provider: Natanael Martinez MD      HISTORY OF PRESENT ILLNESS      [facility]  Patient presenting due to the left leg pain.  Patient had a knee replacement earlier this month.  Ever since returning home she has been having burning and swelling on the back of her leg that has not changed but her home health nurse told her to go to the ER to make sure she did not have a DVT.  Denies chest pain or shortness of breath.  She has been ambulatory with a cane and progressing well with her rehab.      Nursing Notes were reviewed.    REVIEW OF SYSTEMS         Review of Systems  All systems reviewed were negative unless otherwise document in the HPI      PAST MEDICAL HISTORY     Past Medical History:   Diagnosis Date   • Anxiety    • Arthritis    • Endocrine disease    • Sciatica          SURGICAL HISTORY       Past Surgical History:   Procedure Laterality Date   • ABDOMINOPLASTY     • COLONOSCOPY     • FOOT SURGERY Right     BONE SPURS REMOVED   • GASTRIC BYPASS SURGERY  2000   • TOTAL KNEE ARTHROPLASTY Left 12/10/2024    LEFT TOTAL KNEE ARTHROPLASTY performed by Natanael Jordan MD at [facility] MAIN OR   • WISDOM TOOTH EXTRACTION           CURRENT MEDICATIONS       Previous Medications    APIXABAN (ELIQUIS) 2.5 MG TABS TABLET    Take 1 tablet by mouth 2 times daily    APIXABAN (ELIQUIS) 2.5 MG TABS TABLET    Take 1 tablet by mouth 2 times daily    BUSPIRONE (BUSPAR) 5 MG TABLET    TAKE 1 TABLET BY MOUTH THREE TIMES DAILY FOR ANXIETY    DICLOFENAC SODIUM (VOLTAREN) 1 % GEL    2 times daily    FLUOXETINE (PROZAC) 20 MG CAPSULE    Take 1 capsule by mouth 3 times daily    FLUTICASONE (FLONASE) 50 MCG/ACT NASAL SPRAY    2 sprays daily as needed    FUROSEMIDE (LASIX) 40 MG TABLET        HYDROXYZINE HCL (ATARAX) 25 MG TABLET    as needed for Anxiety    LEVOTHYROXINE (SYNTHROID) 125 MCG

## 2024-12-27 NOTE — DISCHARGE INSTRUCTIONS
Return with any new or worsening symptoms.  Continue your medications as directed and follow-up with her surgeon

## 2024-12-27 NOTE — ED TRIAGE NOTES
Pt s/p Left TKR on 12/10- now experiencing left lower leg paia nd swelling.  Pt on Eliquis but was told to come to ER to r/o DVT.  Pt denies CP/SPB

## (undated) DEVICE — GLOVE SURG SZ 8 L12IN FNGR THK94MIL STD WHT LTX FREE

## (undated) DEVICE — SOLUTION IRRIG 3000ML 0.9% SOD CHL USP UROMATIC PLAS CONT

## (undated) DEVICE — SPONGE LAP W18XL18IN WHT COT 4 PLY FLD STRUNG RADPQ DISP ST 2 PER PACK

## (undated) DEVICE — GLOVE ORTHO 8   MSG9480

## (undated) DEVICE — PADDING CAST W6INXL4YD NONSTERILE COT RAYON MICROPLEATED

## (undated) DEVICE — INSTRUMENT KIT ORTHOPEDIC KNEE NAVITRACK

## (undated) DEVICE — GLOVE SURG SZ 65 L12IN FNGR THK94MIL STD WHT LTX FREE

## (undated) DEVICE — SCRUBIN SCRUB BRUSH DRY STER: Brand: MEDLINE INDUSTRIES, INC.

## (undated) DEVICE — BOWL BNE CEM MIX SPAT CURET SMARTMIX CTS

## (undated) DEVICE — SUTURE VICRYL COATED ABSORBABLE BRAIDED 2-0 TP1 54 IN COAT UD VICRYL + VCP880T

## (undated) DEVICE — DRESSING HYDROFIBER AQUACEL AG ADVANTAGE 3.5X10 IN

## (undated) DEVICE — SYRINGE 20ML LL S/C 50

## (undated) DEVICE — GLOVE SURG SZ 65 L12IN FNGR THK79MIL GRN LTX FREE

## (undated) DEVICE — Device

## (undated) DEVICE — PIN FIX STERILE L80MM DIA3.2MM FLUT CAS

## (undated) DEVICE — CONTAINER,SPECIMEN,3OZ,OR STRL: Brand: MEDLINE

## (undated) DEVICE — TAPE,CLOTH/SILK,CURAD,3"X10YD,LF,40/CS: Brand: CURAD

## (undated) DEVICE — SUTURE VICRYL ABSORBABLE BRAIDED 2-0 CT 36 IN DA UD  VCP957H

## (undated) DEVICE — HOOD, PEEL-AWAY: Brand: FLYTE

## (undated) DEVICE — Device: Brand: JELCO

## (undated) DEVICE — ZIMMER® STERILE DISPOSABLE TOURNIQUET CUFF WITH PLC, DUAL PORT, SINGLE BLADDER, 34 IN. (86 CM)

## (undated) DEVICE — SUTURE VICRYL + SZ 0 L36IN ABSRB VLT L40MM CT 1/2 CIR TAPR VCP358H

## (undated) DEVICE — SUTURE STRATAFIX SYMMETRIC PDS + SZ 1 L18IN ABSRB VLT L48MM SXPP1A400

## (undated) DEVICE — BLADE SAW W0.49XL3.15IN THK0.047IN CUT THK0.047IN REPL SAG

## (undated) DEVICE — HANDPIECE SET WITH BONE CLEANING TIP AND SUCTION TUBE: Brand: INTERPULSE

## (undated) DEVICE — DRAPE,EXTREMITY,89X128,STERILE: Brand: MEDLINE

## (undated) DEVICE — TOTAL JOINT - SMH: Brand: MEDLINE INDUSTRIES, INC.

## (undated) DEVICE — TUBING, SUCTION, 9/32" X 12', STRAIGHT: Brand: MEDLINE INDUSTRIES, INC.

## (undated) DEVICE — SPONGE GZ W4XL4IN COT 12 PLY TYP VII WVN C FLD DSGN STERILE

## (undated) DEVICE — BLADE SAW W073XL276IN THK0031IN CUT THK0036IN REPL SAG

## (undated) DEVICE — STRYKER PERFORMANCE SERIES SAGITTAL BLADE: Brand: STRYKER PERFORMANCE SERIES

## (undated) DEVICE — PIN HOLDING HDLSS 3.2X75 MM TROCAR ZUK

## (undated) DEVICE — PIN FIX STERILE L150MM DIA3.2MM FLUT

## (undated) DEVICE — 4-PORT MANIFOLD: Brand: NEPTUNE 2

## (undated) DEVICE — 450 ML BOTTLE OF 0.05% CHLORHEXIDINE GLUCONATE IN 99.95% STERILE WATER FOR IRRIGATION, USP AND APPLICATOR.: Brand: IRRISEPT ANTIMICROBIAL WOUND LAVAGE

## (undated) DEVICE — INSTRUMENT SCREW BNE L25MM DIA2.5MM KNEE FULL THRD HEX FEM PERSONA

## (undated) DEVICE — BANDAGE COMPR M W6INXL10YD WHT BGE VELC E MTRX HK AND LOOP

## (undated) DEVICE — PADDING CAST W6INXL4YD ST COT RAYON MICROPLEATED HIGHLY

## (undated) DEVICE — SOLUTION SURG PREP 26 CC PURPREP